# Patient Record
Sex: FEMALE | Race: WHITE | NOT HISPANIC OR LATINO | Employment: UNEMPLOYED | URBAN - METROPOLITAN AREA
[De-identification: names, ages, dates, MRNs, and addresses within clinical notes are randomized per-mention and may not be internally consistent; named-entity substitution may affect disease eponyms.]

---

## 2023-03-23 ENCOUNTER — APPOINTMENT (OUTPATIENT)
Dept: LAB | Facility: LAB | Age: 83
End: 2023-03-23
Payer: MEDICARE

## 2023-03-23 LAB
BASOPHILS (10*3/UL) IN BLOOD BY AUTOMATED COUNT: 0.07 X10E9/L (ref 0–0.1)
BASOPHILS/100 LEUKOCYTES IN BLOOD BY AUTOMATED COUNT: 0.9 % (ref 0–2)
EOSINOPHILS (10*3/UL) IN BLOOD BY AUTOMATED COUNT: 0.55 X10E9/L (ref 0–0.4)
EOSINOPHILS/100 LEUKOCYTES IN BLOOD BY AUTOMATED COUNT: 7.3 % (ref 0–6)
ERYTHROCYTE DISTRIBUTION WIDTH (RATIO) BY AUTOMATED COUNT: 13.8 % (ref 11.5–14.5)
ERYTHROCYTE MEAN CORPUSCULAR HEMOGLOBIN CONCENTRATION (G/DL) BY AUTOMATED: 31.5 G/DL (ref 32–36)
ERYTHROCYTE MEAN CORPUSCULAR VOLUME (FL) BY AUTOMATED COUNT: 90 FL (ref 80–100)
ERYTHROCYTES (10*6/UL) IN BLOOD BY AUTOMATED COUNT: 5.37 X10E12/L (ref 4–5.2)
FERRITIN (UG/LL) IN SER/PLAS: 44 UG/L (ref 8–150)
HEMATOCRIT (%) IN BLOOD BY AUTOMATED COUNT: 48.2 % (ref 36–46)
HEMOGLOBIN (G/DL) IN BLOOD: 15.2 G/DL (ref 12–16)
IMMATURE GRANULOCYTES/100 LEUKOCYTES IN BLOOD BY AUTOMATED COUNT: 0.4 % (ref 0–0.9)
IRON (UG/DL) IN SER/PLAS: 74 UG/DL (ref 35–150)
IRON BINDING CAPACITY (UG/DL) IN SER/PLAS: 343 UG/DL (ref 240–445)
IRON SATURATION (%) IN SER/PLAS: 22 % (ref 25–45)
LEUKOCYTES (10*3/UL) IN BLOOD BY AUTOMATED COUNT: 7.5 X10E9/L (ref 4.4–11.3)
LYMPHOCYTES (10*3/UL) IN BLOOD BY AUTOMATED COUNT: 1.16 X10E9/L (ref 0.8–3)
LYMPHOCYTES/100 LEUKOCYTES IN BLOOD BY AUTOMATED COUNT: 15.4 % (ref 13–44)
MONOCYTES (10*3/UL) IN BLOOD BY AUTOMATED COUNT: 0.61 X10E9/L (ref 0.05–0.8)
MONOCYTES/100 LEUKOCYTES IN BLOOD BY AUTOMATED COUNT: 8.1 % (ref 2–10)
NEUTROPHILS (10*3/UL) IN BLOOD BY AUTOMATED COUNT: 5.1 X10E9/L (ref 1.6–5.5)
NEUTROPHILS/100 LEUKOCYTES IN BLOOD BY AUTOMATED COUNT: 67.9 % (ref 40–80)
PLATELETS (10*3/UL) IN BLOOD AUTOMATED COUNT: 360 X10E9/L (ref 150–450)

## 2023-03-24 LAB — COBALAMIN (VITAMIN B12) (PG/ML) IN SER/PLAS: 672 PG/ML (ref 211–911)

## 2023-07-07 ENCOUNTER — LAB (OUTPATIENT)
Dept: LAB | Facility: LAB | Age: 83
End: 2023-07-07
Payer: MEDICARE

## 2023-07-07 DIAGNOSIS — R06.09 DYSPNEA ON EXERTION: ICD-10-CM

## 2023-07-07 DIAGNOSIS — E78.5 HYPERLIPIDEMIA, UNSPECIFIED HYPERLIPIDEMIA TYPE: ICD-10-CM

## 2023-07-07 DIAGNOSIS — D50.9 IRON DEFICIENCY ANEMIA, UNSPECIFIED IRON DEFICIENCY ANEMIA TYPE: ICD-10-CM

## 2023-07-07 DIAGNOSIS — E03.9 PRIMARY HYPOTHYROIDISM: ICD-10-CM

## 2023-07-07 PROBLEM — L71.9 ROSACEA: Status: ACTIVE | Noted: 2023-07-07

## 2023-07-07 PROBLEM — K44.9 HIATAL HERNIA WITH GERD AND ESOPHAGITIS: Status: ACTIVE | Noted: 2023-07-07

## 2023-07-07 PROBLEM — N95.1 POSTMENOPAUSAL DISORDER: Status: ACTIVE | Noted: 2023-07-07

## 2023-07-07 PROBLEM — K21.00 HIATAL HERNIA WITH GERD AND ESOPHAGITIS: Status: ACTIVE | Noted: 2023-07-07

## 2023-07-07 PROBLEM — N85.9 FLUID IN ENDOMETRIAL CAVITY: Status: ACTIVE | Noted: 2023-07-07

## 2023-07-07 PROBLEM — R01.1 HEART MURMUR: Status: ACTIVE | Noted: 2023-07-07

## 2023-07-07 PROBLEM — G56.01 CARPAL TUNNEL SYNDROME OF RIGHT WRIST: Status: ACTIVE | Noted: 2023-07-07

## 2023-07-07 PROBLEM — L30.9 ECZEMA: Status: ACTIVE | Noted: 2023-07-07

## 2023-07-07 PROBLEM — G89.29 CHRONIC PAIN OF LEFT KNEE: Status: ACTIVE | Noted: 2023-07-07

## 2023-07-07 PROBLEM — M25.512 LEFT SHOULDER PAIN: Status: ACTIVE | Noted: 2023-07-07

## 2023-07-07 PROBLEM — M25.562 CHRONIC PAIN OF LEFT KNEE: Status: ACTIVE | Noted: 2023-07-07

## 2023-07-07 PROBLEM — K59.09 CHRONIC CONSTIPATION: Status: ACTIVE | Noted: 2023-07-07

## 2023-07-07 PROBLEM — L29.2 VULVAR PRURITUS: Status: ACTIVE | Noted: 2023-07-07

## 2023-07-07 PROBLEM — R63.4 UNEXPLAINED WEIGHT LOSS: Status: ACTIVE | Noted: 2023-07-07

## 2023-07-07 PROBLEM — M54.50 LOWER BACK PAIN: Status: ACTIVE | Noted: 2023-07-07

## 2023-07-07 LAB
ALANINE AMINOTRANSFERASE (SGPT) (U/L) IN SER/PLAS: 12 U/L (ref 7–45)
ALBUMIN (G/DL) IN SER/PLAS: 4.1 G/DL (ref 3.4–5)
ALKALINE PHOSPHATASE (U/L) IN SER/PLAS: 127 U/L (ref 33–136)
ANION GAP IN SER/PLAS: 16 MMOL/L (ref 10–20)
ASPARTATE AMINOTRANSFERASE (SGOT) (U/L) IN SER/PLAS: 20 U/L (ref 9–39)
BASOPHILS (10*3/UL) IN BLOOD BY AUTOMATED COUNT: 0.09 X10E9/L (ref 0–0.1)
BASOPHILS/100 LEUKOCYTES IN BLOOD BY AUTOMATED COUNT: 1.2 % (ref 0–2)
BILIRUBIN TOTAL (MG/DL) IN SER/PLAS: 0.5 MG/DL (ref 0–1.2)
CALCIUM (MG/DL) IN SER/PLAS: 9.7 MG/DL (ref 8.6–10.3)
CARBON DIOXIDE, TOTAL (MMOL/L) IN SER/PLAS: 28 MMOL/L (ref 21–32)
CHLORIDE (MMOL/L) IN SER/PLAS: 103 MMOL/L (ref 98–107)
CHOLESTEROL (MG/DL) IN SER/PLAS: 186 MG/DL (ref 0–199)
CHOLESTEROL IN HDL (MG/DL) IN SER/PLAS: 47.6 MG/DL
CHOLESTEROL/HDL RATIO: 3.9
COBALAMIN (VITAMIN B12) (PG/ML) IN SER/PLAS: 502 PG/ML (ref 211–911)
CREATININE (MG/DL) IN SER/PLAS: 0.79 MG/DL (ref 0.5–1.05)
EOSINOPHILS (10*3/UL) IN BLOOD BY AUTOMATED COUNT: 0.57 X10E9/L (ref 0–0.4)
EOSINOPHILS/100 LEUKOCYTES IN BLOOD BY AUTOMATED COUNT: 7.5 % (ref 0–6)
ERYTHROCYTE DISTRIBUTION WIDTH (RATIO) BY AUTOMATED COUNT: 13.1 % (ref 11.5–14.5)
ERYTHROCYTE MEAN CORPUSCULAR HEMOGLOBIN CONCENTRATION (G/DL) BY AUTOMATED: 30.8 G/DL (ref 32–36)
ERYTHROCYTE MEAN CORPUSCULAR VOLUME (FL) BY AUTOMATED COUNT: 90 FL (ref 80–100)
ERYTHROCYTES (10*6/UL) IN BLOOD BY AUTOMATED COUNT: 5.45 X10E12/L (ref 4–5.2)
FERRITIN (UG/LL) IN SER/PLAS: 40 UG/L (ref 8–150)
GFR FEMALE: 74 ML/MIN/1.73M2
GLUCOSE (MG/DL) IN SER/PLAS: 84 MG/DL (ref 74–99)
HEMATOCRIT (%) IN BLOOD BY AUTOMATED COUNT: 49.1 % (ref 36–46)
HEMOGLOBIN (G/DL) IN BLOOD: 15.1 G/DL (ref 12–16)
IMMATURE GRANULOCYTES/100 LEUKOCYTES IN BLOOD BY AUTOMATED COUNT: 0.3 % (ref 0–0.9)
IRON (UG/DL) IN SER/PLAS: 54 UG/DL (ref 35–150)
IRON BINDING CAPACITY (UG/DL) IN SER/PLAS: 380 UG/DL (ref 240–445)
IRON SATURATION (%) IN SER/PLAS: 14 % (ref 25–45)
LDL: 116 MG/DL (ref 0–99)
LEUKOCYTES (10*3/UL) IN BLOOD BY AUTOMATED COUNT: 7.6 X10E9/L (ref 4.4–11.3)
LYMPHOCYTES (10*3/UL) IN BLOOD BY AUTOMATED COUNT: 1.07 X10E9/L (ref 0.8–3)
LYMPHOCYTES/100 LEUKOCYTES IN BLOOD BY AUTOMATED COUNT: 14.1 % (ref 13–44)
MONOCYTES (10*3/UL) IN BLOOD BY AUTOMATED COUNT: 0.6 X10E9/L (ref 0.05–0.8)
MONOCYTES/100 LEUKOCYTES IN BLOOD BY AUTOMATED COUNT: 7.9 % (ref 2–10)
NEUTROPHILS (10*3/UL) IN BLOOD BY AUTOMATED COUNT: 5.24 X10E9/L (ref 1.6–5.5)
NEUTROPHILS/100 LEUKOCYTES IN BLOOD BY AUTOMATED COUNT: 69 % (ref 40–80)
PLATELETS (10*3/UL) IN BLOOD AUTOMATED COUNT: 449 X10E9/L (ref 150–450)
POTASSIUM (MMOL/L) IN SER/PLAS: 4.8 MMOL/L (ref 3.5–5.3)
PROTEIN TOTAL: 6.9 G/DL (ref 6.4–8.2)
SODIUM (MMOL/L) IN SER/PLAS: 142 MMOL/L (ref 136–145)
THYROTROPIN (MIU/L) IN SER/PLAS BY DETECTION LIMIT <= 0.05 MIU/L: 3.07 MIU/L (ref 0.44–3.98)
THYROXINE (T4) FREE (NG/DL) IN SER/PLAS: 0.84 NG/DL (ref 0.61–1.12)
TRIGLYCERIDE (MG/DL) IN SER/PLAS: 114 MG/DL (ref 0–149)
UREA NITROGEN (MG/DL) IN SER/PLAS: 18 MG/DL (ref 6–23)
VLDL: 23 MG/DL (ref 0–40)

## 2023-07-07 PROCEDURE — 84443 ASSAY THYROID STIM HORMONE: CPT

## 2023-07-07 PROCEDURE — 83550 IRON BINDING TEST: CPT

## 2023-07-07 PROCEDURE — 80061 LIPID PANEL: CPT

## 2023-07-07 PROCEDURE — 82607 VITAMIN B-12: CPT

## 2023-07-07 PROCEDURE — 36415 COLL VENOUS BLD VENIPUNCTURE: CPT

## 2023-07-07 PROCEDURE — 85025 COMPLETE CBC W/AUTO DIFF WBC: CPT

## 2023-07-07 PROCEDURE — 82728 ASSAY OF FERRITIN: CPT

## 2023-07-07 PROCEDURE — 84439 ASSAY OF FREE THYROXINE: CPT

## 2023-07-07 PROCEDURE — 83540 ASSAY OF IRON: CPT

## 2023-07-07 PROCEDURE — 80053 COMPREHEN METABOLIC PANEL: CPT

## 2023-07-07 NOTE — PROGRESS NOTES
Orders Placed This Encounter   Procedures    T4, free     Standing Status:   Future     Standing Expiration Date:   7/7/2024     Order Specific Question:   Release result to MyChart     Answer:   Immediate    Tsh With Reflex To Free T4 If Abnormal     Standing Status:   Future     Standing Expiration Date:   7/7/2024     Order Specific Question:   Release result to MyChart     Answer:   Immediate    CBC and Auto Differential     Standing Status:   Future     Standing Expiration Date:   7/7/2024     Order Specific Question:   Release result to MyChart     Answer:   Immediate    Vitamin B12     Standing Status:   Future     Standing Expiration Date:   7/7/2024     Order Specific Question:   Release result to MyChart     Answer:   Immediate    Lipid panel     Standing Status:   Future     Standing Expiration Date:   7/7/2024     Order Specific Question:   Release result to MyChart     Answer:   Immediate    Comprehensive metabolic panel     Standing Status:   Future     Standing Expiration Date:   7/7/2024     Order Specific Question:   Release result to MyChart     Answer:   Immediate    Iron and TIBC     Standing Status:   Future     Standing Expiration Date:   7/7/2024     Order Specific Question:   Release result to MyChart     Answer:   Immediate    Ferritin     Standing Status:   Future     Standing Expiration Date:   7/7/2024     Order Specific Question:   Release result to MyChart     Answer:   Immediate

## 2023-07-13 ENCOUNTER — OFFICE VISIT (OUTPATIENT)
Dept: PRIMARY CARE | Facility: CLINIC | Age: 83
End: 2023-07-13
Payer: MEDICARE

## 2023-07-13 VITALS
HEIGHT: 61 IN | DIASTOLIC BLOOD PRESSURE: 79 MMHG | BODY MASS INDEX: 28.89 KG/M2 | HEART RATE: 86 BPM | WEIGHT: 153 LBS | SYSTOLIC BLOOD PRESSURE: 124 MMHG

## 2023-07-13 DIAGNOSIS — Z00.00 ROUTINE GENERAL MEDICAL EXAMINATION AT HEALTH CARE FACILITY: Primary | ICD-10-CM

## 2023-07-13 DIAGNOSIS — M25.562 CHRONIC PAIN OF LEFT KNEE: ICD-10-CM

## 2023-07-13 DIAGNOSIS — Z12.31 VISIT FOR SCREENING MAMMOGRAM: ICD-10-CM

## 2023-07-13 DIAGNOSIS — G89.29 CHRONIC PAIN OF LEFT KNEE: ICD-10-CM

## 2023-07-13 DIAGNOSIS — G31.84 MILD COGNITIVE IMPAIRMENT: ICD-10-CM

## 2023-07-13 PROCEDURE — 1160F RVW MEDS BY RX/DR IN RCRD: CPT | Performed by: INTERNAL MEDICINE

## 2023-07-13 PROCEDURE — 1036F TOBACCO NON-USER: CPT | Performed by: INTERNAL MEDICINE

## 2023-07-13 PROCEDURE — 1159F MED LIST DOCD IN RCRD: CPT | Performed by: INTERNAL MEDICINE

## 2023-07-13 PROCEDURE — 99214 OFFICE O/P EST MOD 30 MIN: CPT | Performed by: INTERNAL MEDICINE

## 2023-07-13 PROCEDURE — G0439 PPPS, SUBSEQ VISIT: HCPCS | Performed by: INTERNAL MEDICINE

## 2023-07-13 PROCEDURE — 1170F FXNL STATUS ASSESSED: CPT | Performed by: INTERNAL MEDICINE

## 2023-07-13 RX ORDER — DESOXIMETASONE 2.5 MG/G
CREAM TOPICAL
COMMUNITY
Start: 2023-04-24

## 2023-07-13 RX ORDER — TRIAMCINOLONE ACETONIDE 0.25 MG/G
CREAM TOPICAL
COMMUNITY
Start: 2023-02-10 | End: 2024-01-05 | Stop reason: ALTCHOICE

## 2023-07-13 RX ORDER — MULTIVIT-MIN/FA/LYCOPEN/LUTEIN .4-300-25
TABLET ORAL
COMMUNITY
Start: 2014-01-20

## 2023-07-13 ASSESSMENT — ACTIVITIES OF DAILY LIVING (ADL)
DOING_HOUSEWORK: INDEPENDENT
MANAGING_FINANCES: INDEPENDENT
TAKING_MEDICATION: INDEPENDENT
DRESSING: INDEPENDENT
GROCERY_SHOPPING: INDEPENDENT
BATHING: INDEPENDENT

## 2023-07-13 NOTE — PATIENT INSTRUCTIONS
"Memory check, you did well, only missed the date and day of week  Socialize, exercise, eat a healthy diet    Iron deficiency anemia, continue to see Trista every 6 months    Call [phone] to schedule mammogram    Esophagitis, stay on prilosec 40mg daily    Labs all looked good    I recommend that you get the shingrix shots. Shingles vaccination requires a 2 shots series divided by 2 to 6 months. Get at the pharmacy. Ask the pharmacist \"how much\" prior to injected due cost varies based on your insurance. Shingrix can make you feel tired and achy for a few days after so do not get it prior to a big event . Should be free    You need a pneumovax, second pneumonia shot, had prevnar 13 in 2020, get at the pharmacy    Thickened endometrial lining (lining of the uterus), you need to see gynecology. I will send them an email and they will call     Chronic knee pain, ordered handicap placard, take to [facility]    Recheck in 1 year  "

## 2023-07-13 NOTE — PROGRESS NOTES
Subjective   Patient ID: Nicole oBbo is a 82 y.o. female who presents for Medicare Annual Wellness Visit Subsequent.    HPI     Review of Systems    Objective   There were no vitals taken for this visit.    Physical Exam    Assessment/Plan

## 2023-07-13 NOTE — PROGRESS NOTES
"Subjective   Reason for Visit: Nicole Bobo is an 82 y.o. female here for a Medicare Wellness visit.     Past Medical, Surgical, and Family History reviewed and updated in chart.    Reviewed all medications by prescribing practitioner or clinical pharmacist (such as prescriptions, OTCs, herbal therapies and supplements) and documented in the medical record.    She was on prilosec 40mg daily for 1 year, she just finished it up  She does not get heartburn    She has some word finding issues and remembering peoples names    She gets knee pain, uses a cane, would like a handicap placard due to cannot walk very far    No cp or pressure  She gets sob when she walks a longer distance  She does wheeze when she is near a dog  Bowels are regular. No black stools or blood in the stool  Urine flow is fine, she has some urge incontinence, nocturia 2-3 times  a night.   No spotting        Patient Care Team:  Aliyah Sosa, DO as PCP - General  Aliyah Sosa DO as PCP - [employer] PCP     Review of Systems    Objective   Vitals:  /79   Pulse 86   Ht 1.549 m (5' 1\")   Wt 69.4 kg (153 lb)   BMI 28.91 kg/m²       Physical Exam  Constitutional:       Appearance: Normal appearance.   HENT:      Nose:      Comments: Turbs are pale  Neck:      Vascular: No carotid bruit.   Cardiovascular:      Rate and Rhythm: Normal rate and regular rhythm.      Heart sounds: No murmur heard.  Pulmonary:      Effort: Pulmonary effort is normal.      Comments: Few dry basilar crackles  Chest:   Breasts:     Right: No mass.      Left: No mass.   Abdominal:      General: Abdomen is flat.      Palpations: Abdomen is soft. There is no mass.   Musculoskeletal:      Right lower leg: No edema.      Left lower leg: No edema.   Lymphadenopathy:      Cervical: No cervical adenopathy.   Neurological:      Comments: MMSE 27/30, missed town , date and attila   Psychiatric:         Mood and Affect: Mood normal.         Assessment/Plan " "  Problem List Items Addressed This Visit       Chronic pain of left knee     Other Visit Diagnoses       Routine general medical examination at health care facility    -  Primary    Visit for screening mammogram        Mild cognitive impairment                Patient Instructions   Memory check, you did well, only missed the date and day of week  Socialize, exercise, eat a healthy diet    Iron deficiency anemia, continue to see Trista every 6 months    Call [phone] to schedule mammogram    Esophagitis, stay on prilosec 40mg daily    Labs all looked good    I recommend that you get the shingrix shots. Shingles vaccination requires a 2 shots series divided by 2 to 6 months. Get at the pharmacy. Ask the pharmacist \"how much\" prior to injected due cost varies based on your insurance. Shingrix can make you feel tired and achy for a few days after so do not get it prior to a big event . Should be free    You need a pneumovax, second pneumonia shot, had prevnar 13 in 2020, get at the pharmacy    Thickened endometrial lining (lining of the uterus), you need to see gynecology. I will send them an email and they will call     Chronic knee pain, ordered handicap placard, take to [facility]    Recheck in 1 year      "

## 2023-08-25 RX ORDER — NITROFURANTOIN 25; 75 MG/1; MG/1
1 CAPSULE ORAL 2 TIMES DAILY
COMMUNITY
Start: 2022-12-10 | End: 2023-10-04 | Stop reason: ALTCHOICE

## 2023-08-25 RX ORDER — CEPHALEXIN 500 MG/1
500 CAPSULE ORAL EVERY 12 HOURS
COMMUNITY
Start: 2023-03-21 | End: 2023-10-04 | Stop reason: ALTCHOICE

## 2023-08-25 RX ORDER — SULFAMETHOXAZOLE AND TRIMETHOPRIM 800; 160 MG/1; MG/1
1 TABLET ORAL 2 TIMES DAILY
COMMUNITY
Start: 2022-10-02 | End: 2023-10-04 | Stop reason: ALTCHOICE

## 2023-08-25 RX ORDER — ESOMEPRAZOLE MAGNESIUM 40 MG/1
1 CAPSULE, DELAYED RELEASE ORAL DAILY
COMMUNITY
Start: 2023-02-28 | End: 2023-10-04 | Stop reason: ALTCHOICE

## 2023-09-20 DIAGNOSIS — D50.9 IRON DEFICIENCY ANEMIA, UNSPECIFIED IRON DEFICIENCY ANEMIA TYPE: Primary | ICD-10-CM

## 2023-09-20 PROBLEM — D64.9 ANEMIA: Status: ACTIVE | Noted: 2023-09-20

## 2023-10-04 ENCOUNTER — OFFICE VISIT (OUTPATIENT)
Dept: HEMATOLOGY/ONCOLOGY | Facility: HOSPITAL | Age: 83
End: 2023-10-04
Payer: MEDICARE

## 2023-10-04 ENCOUNTER — INFUSION (OUTPATIENT)
Dept: HEMATOLOGY/ONCOLOGY | Facility: HOSPITAL | Age: 83
End: 2023-10-04
Payer: MEDICARE

## 2023-10-04 ENCOUNTER — LAB (OUTPATIENT)
Dept: LAB | Facility: LAB | Age: 83
End: 2023-10-04
Payer: MEDICARE

## 2023-10-04 VITALS
WEIGHT: 153 LBS | HEART RATE: 87 BPM | BODY MASS INDEX: 28.89 KG/M2 | HEIGHT: 61 IN | DIASTOLIC BLOOD PRESSURE: 74 MMHG | RESPIRATION RATE: 18 BRPM | OXYGEN SATURATION: 95 % | SYSTOLIC BLOOD PRESSURE: 129 MMHG | TEMPERATURE: 98.1 F

## 2023-10-04 DIAGNOSIS — D50.8 OTHER IRON DEFICIENCY ANEMIA: ICD-10-CM

## 2023-10-04 DIAGNOSIS — D50.9 IRON DEFICIENCY ANEMIA, UNSPECIFIED IRON DEFICIENCY ANEMIA TYPE: Primary | ICD-10-CM

## 2023-10-04 DIAGNOSIS — D63.1 ANEMIA DUE TO STAGE 3 CHRONIC KIDNEY DISEASE, UNSPECIFIED WHETHER STAGE 3A OR 3B CKD (MULTI): ICD-10-CM

## 2023-10-04 DIAGNOSIS — N18.30 ANEMIA DUE TO STAGE 3 CHRONIC KIDNEY DISEASE, UNSPECIFIED WHETHER STAGE 3A OR 3B CKD (MULTI): ICD-10-CM

## 2023-10-04 LAB
BASOPHILS # BLD AUTO: 0.11 X10*3/UL (ref 0–0.1)
BASOPHILS NFR BLD AUTO: 1.4 %
EOSINOPHIL # BLD AUTO: 0.5 X10*3/UL (ref 0–0.4)
EOSINOPHIL NFR BLD AUTO: 6.5 %
ERYTHROCYTE [DISTWIDTH] IN BLOOD BY AUTOMATED COUNT: 15.6 % (ref 11.5–14.5)
FERRITIN SERPL-MCNC: 9 NG/ML (ref 8–150)
HCT VFR BLD AUTO: 34.3 % (ref 36–46)
HGB BLD-MCNC: 10.1 G/DL (ref 12–16)
IMM GRANULOCYTES # BLD AUTO: 0.03 X10*3/UL (ref 0–0.5)
IMM GRANULOCYTES NFR BLD AUTO: 0.4 % (ref 0–0.9)
IRON SATN MFR SERPL: 3 % (ref 25–45)
IRON SERPL-MCNC: 12 UG/DL (ref 35–150)
LYMPHOCYTES # BLD AUTO: 1.41 X10*3/UL (ref 0.8–3)
LYMPHOCYTES NFR BLD AUTO: 18.4 %
MCH RBC QN AUTO: 23 PG (ref 26–34)
MCHC RBC AUTO-ENTMCNC: 29.4 G/DL (ref 32–36)
MCV RBC AUTO: 78 FL (ref 80–100)
MONOCYTES # BLD AUTO: 0.52 X10*3/UL (ref 0.05–0.8)
MONOCYTES NFR BLD AUTO: 6.8 %
NEUTROPHILS # BLD AUTO: 5.09 X10*3/UL (ref 1.6–5.5)
NEUTROPHILS NFR BLD AUTO: 66.5 %
NRBC BLD-RTO: 0 /100 WBCS (ref 0–0)
PLATELET # BLD AUTO: 537 X10*3/UL (ref 150–450)
PMV BLD AUTO: 8.8 FL (ref 7.5–11.5)
RBC # BLD AUTO: 4.4 X10*6/UL (ref 4–5.2)
TIBC SERPL-MCNC: 460 UG/DL (ref 240–445)
UIBC SERPL-MCNC: 448 UG/DL (ref 110–370)
VIT B12 SERPL-MCNC: 448 PG/ML (ref 211–911)
WBC # BLD AUTO: 7.7 X10*3/UL (ref 4.4–11.3)

## 2023-10-04 PROCEDURE — 1036F TOBACCO NON-USER: CPT

## 2023-10-04 PROCEDURE — 83550 IRON BINDING TEST: CPT

## 2023-10-04 PROCEDURE — 36415 COLL VENOUS BLD VENIPUNCTURE: CPT

## 2023-10-04 PROCEDURE — 1160F RVW MEDS BY RX/DR IN RCRD: CPT

## 2023-10-04 PROCEDURE — 99213 OFFICE O/P EST LOW 20 MIN: CPT

## 2023-10-04 PROCEDURE — 1125F AMNT PAIN NOTED PAIN PRSNT: CPT

## 2023-10-04 PROCEDURE — 1159F MED LIST DOCD IN RCRD: CPT

## 2023-10-04 PROCEDURE — 83540 ASSAY OF IRON: CPT

## 2023-10-04 PROCEDURE — 82607 VITAMIN B-12: CPT

## 2023-10-04 PROCEDURE — 82728 ASSAY OF FERRITIN: CPT

## 2023-10-04 PROCEDURE — 85025 COMPLETE CBC W/AUTO DIFF WBC: CPT

## 2023-10-04 RX ORDER — METHYLPREDNISOLONE SODIUM SUCCINATE 40 MG/ML
40 INJECTION INTRAMUSCULAR; INTRAVENOUS ONCE
OUTPATIENT
Start: 2023-10-11

## 2023-10-04 RX ORDER — METHYLPREDNISOLONE SODIUM SUCCINATE 40 MG/ML
40 INJECTION INTRAMUSCULAR; INTRAVENOUS AS NEEDED
Status: CANCELLED | OUTPATIENT
Start: 2023-10-11

## 2023-10-04 RX ORDER — DIPHENHYDRAMINE HYDROCHLORIDE 50 MG/ML
50 INJECTION INTRAMUSCULAR; INTRAVENOUS ONCE
OUTPATIENT
Start: 2023-10-11

## 2023-10-04 RX ORDER — FAMOTIDINE 10 MG/ML
20 INJECTION INTRAVENOUS ONCE AS NEEDED
Status: CANCELLED | OUTPATIENT
Start: 2023-10-11

## 2023-10-04 RX ORDER — EPINEPHRINE 0.3 MG/.3ML
0.3 INJECTION SUBCUTANEOUS EVERY 5 MIN PRN
Status: CANCELLED | OUTPATIENT
Start: 2023-10-11

## 2023-10-04 RX ORDER — DIPHENHYDRAMINE HYDROCHLORIDE 50 MG/ML
50 INJECTION INTRAMUSCULAR; INTRAVENOUS ONCE
OUTPATIENT
Start: 2023-10-18

## 2023-10-04 RX ORDER — METHYLPREDNISOLONE SODIUM SUCCINATE 40 MG/ML
40 INJECTION INTRAMUSCULAR; INTRAVENOUS ONCE
OUTPATIENT
Start: 2023-10-18

## 2023-10-04 RX ORDER — ALBUTEROL SULFATE 0.83 MG/ML
3 SOLUTION RESPIRATORY (INHALATION) AS NEEDED
Status: CANCELLED | OUTPATIENT
Start: 2023-10-11

## 2023-10-04 RX ORDER — DIPHENHYDRAMINE HYDROCHLORIDE 50 MG/ML
50 INJECTION INTRAMUSCULAR; INTRAVENOUS AS NEEDED
Status: CANCELLED | OUTPATIENT
Start: 2023-10-11

## 2023-10-04 ASSESSMENT — PAIN SCALES - GENERAL: PAINLEVEL: 10-WORST PAIN EVER

## 2023-10-04 NOTE — PROGRESS NOTES
Patient Visit Information:   Visit Type: Benign Heme Follow-up Anemia    Treatment Synopsis:   82-year-old woman with no sig PMH referred for evaluation and management of recurrent microcytic anemia c/w DINH. Patient first noted have microcytic anemia in 7/2020 with H/H 8.1/29.4 MCV 65 with ferritin <8. Patient had EGD/colonoscopy c/w mild gastritis was placed on PPI for 2 months.  She was also placed on oral iron but stopped taking it due to increasing constipation problems (of note, patient has chronic constipation takes Miralax Daily). Patient states that she only took it for 1 week before stopping. In March 2021, repeat CBC showed continued anemia but improved iron studies c/w H/H 10.8/38, MCV 78, ferritin 60, iron sat% 33%.    In may 2021, patient developed SOB/BURROUGHS . CBC on 5/14/2021 showed hemoglobin was 6.6/22.9, MCV 67 with ferritin of 11, and iron sat uncalculatable. Of note, accompanying her anemia is thrombocytosis (527-->693-->521) which likely represents a reactive process. She has had normal WBC's. She was sent to ED and transfused 2 units PRBC. Patient states that prior to ED visit she was in her usual state of health.     Notation from 6/15/2021: Fluid within endometrial cavity of the uterus. Given the postmenopausal status of the patient recommend correlation with pelvic ultrasound to assess for underlying endometrial lesions. Right middle lobe pulmonary nodule demonstrating stability since 2015 benign. Moderate sliding hiatal hernia. Low-density lesion within segment 7 has demonstrated stability since 2015 favoring hepatic cyst.       History of Present Illness:     ID Statement:   CHELSEY FLETCHER is a 82 year old Female      Chief Complaint: Microcytic anemia  Interval History:   Patient presents today for follow-up regarding microcytic and iron deficiency anemia.  Patient states overall she feels very well.  She denies any fever, recent illness, bleeding or bruising, cold hands or feet, no  pica, no abdominal pain, no melena, no bleeding from any location, no bowel or bladder concerns, no cough, no shortness of breath or chest pain.  he has had no nosebleeds or hematemesis.  She has a history of arthritis and does have discomfort in her lower back with excessive movement.  But she continues to do her ADLs throughout the day without difficulty.  She has had left knee issues and swelling and is seeing orthopedic.  She had a recent colonoscopy less than a year and a half ago that did not note any bleeding though did note a hiatal hernia in which she received 6 months of Protonix.  She has had no follow-up since.  May 2022 she had 3 doses of IV Venofer. On assessment, reports recent UTI on abx completed yesterday.  Appetite is stable.  She notes a well balanced diet. No weight loss.     Review of Systems:   -12 pt ROS was performed, pertinent positive and negative findings as per HPI above, remainder of systems reviewed and are negative.      PMH/PSH:   Chronic constipation, Eczema, macular degeneration, tonsillectomy, appendectomy   FH:   denies any FH of malignancy, bleeding or clotting disorders   Sox Hx:   Smoked x 1 year' Worked at [employer]. . Lives in [place]. 3 children.      Allergies and Intolerances: Dog Dander  No Known drug Allergies    Outpatient Medication Profile:  * Incomplete Medication History as of 10-Dec-2022 09:16 documented in Structured Notes   cephalexin 500 mg oral capsule: 1 cap(s) orally every 12 hours , Start Date: 21-Mar-2023   Macrobid 100 mg oral capsule: 1  orally 2 times a day, Start Date: 10-Dec-2022   Pyridium 200 mg oral tablet: 1 tab(s) orally 3 times a day (after meals) , Start Date: 27-Nov-2022   Bactrim  mg-160 mg oral tablet: 1 tab(s) orally 2 times a day x 5 days , Start Date: 27-Nov-2022   Bactrim  mg-160 mg oral tablet: 1 tab(s) orally 2 times a day , Start Date: 02-Oct-2022   Centrum oral tablet: 1 tab(s) orally once a day   clobetasol  0.05% topical cream: Apply topically to affected area 2 times a day, As Needed   desoximetasone 0.05% topical cream: Apply topically to affected area 2 times a day, As Needed   Triamcinolone Acetonide in Absorbase 0.05% topical ointment: Apply topically to affected area 2 times a day, As Needed   PreserVision oral capsule: 1 cap(s) orally twice a day   Advil 200 mg oral tablet: 3 tab(s) orally every 6 hours, As Needed    Vitals and Measurements:   Last 3 Weights & Heights: Date: Weight/Scale Type standing Height:   03-May-2022 13:36  71.4  kg         153.8  cm  16-Nov-2021 12:00  69.9  kg         156  cm  10-Sep-2021 13:22  70.2  kg / standing scale  156  cm    Physical Exam:      Constitutional: Well developed, awake/alert/oriented  x3, no distress, alert and cooperative   Eyes: PERRL, EOMI, clear sclera   Respiratory/Thorax: Patent airways, CTAB, normal  breath sounds with good chest expansion, thorax symmetric   Cardiovascular: Regular, rate and rhythm, no murmurs,  2+ equal pulses of the extremities, normal S 1and S 2   Gastrointestinal: Nondistended, soft, non-tender,  no rebound tenderness or guarding, no masses palpable, no organomegaly, +BS, no bruits   Extremities: normal extremities, no cyanosis edema,  contusions or wounds, no clubbing   Neurological: alert and oriented x3, intact senses,  motor, response and reflexes, normal strength   Psychological: Appropriate mood and behavior   Skin: Warm and dry, no lesions, no rashes     Lab Results:   Latest Reference Range & Units 10/04/23 08:40   FERRITIN 8 - 150 ng/mL 9   IRON 35 - 150 ug/dL 12 (L)   TIBC 240 - 445 ug/dL 460 (H)   UIBC 110 - 370 ug/dL 448 (H)   % Saturation 25 - 45 % 3 (L)   WBC 4.4 - 11.3 x10*3/uL 7.7   nRBC 0.0 - 0.0 /100 WBCs 0.0   RBC 4.00 - 5.20 x10*6/uL 4.40   HEMOGLOBIN 12.0 - 16.0 g/dL 10.1 (L)   HEMATOCRIT 36.0 - 46.0 % 34.3 (L)   MCV 80 - 100 fL 78 (L)   MCH 26.0 - 34.0 pg 23.0 (L)   MCHC 32.0 - 36.0 g/dL 29.4 (L)   RED CELL  DISTRIBUTION WIDTH 11.5 - 14.5 % 15.6 (H)   Platelets 150 - 450 x10*3/uL 537 (H)   MEAN PLATELET VOLUME 7.5 - 11.5 fL 8.8   Neutrophils % 40.0 - 80.0 % 66.5   Immature Granulocytes %, Automated 0.0 - 0.9 % 0.4   Lymphocytes % 13.0 - 44.0 % 18.4   Monocytes % 2.0 - 10.0 % 6.8   Eosinophils % 0.0 - 6.0 % 6.5   Basophils % 0.0 - 2.0 % 1.4   Neutrophils Absolute 1.60 - 5.50 x10*3/uL 5.09   Immature Granulocytes Absolute, Automated 0.00 - 0.50 x10*3/uL 0.03   Lymphocytes Absolute 0.80 - 3.00 x10*3/uL 1.41   Monocytes Absolute 0.05 - 0.80 x10*3/uL 0.52   Eosinophils Absolute 0.00 - 0.40 x10*3/uL 0.50 (H)   Basophils Absolute 0.00 - 0.10 x10*3/uL 0.11 (H)     CBC date/time       WBC     HGB     HCT     PLT     Neut      23-Mar-2023 09:40   7.5     15.2    48.2(H) 360     5.10  17-Aug-2022 08:11   8.2     15.1    48.2(H) 308     5.75(H)  28-Apr-2022 14:25   7.6     10.4(L) 35.0(L) 496(H)  N/A  20-Dec-2021 09:38   8.4     13.9    44.2    356     N/A  14-Oct-2021 11:02   8.9     14.0    45.1    421     6.17(H)  14-Sep-2021 14:32   7.6     13.0    41.8    368     4.99  10-Sep-2021 13:47   7.8     13.3    42.0    390     N/A    BMP date/time       NA              K               CL              CO2             BUN             CREAT             17-Aug-2022 08:11   143             4.3             106             N/A             19              0.86  14-Sep-2021 14:32   140             4.0             105             N/A             18              0.88  25-May-2021 13:50   141             4.0             105             N/A             16              0.65  14-May-2021 12:51   140             4.5             105             N/A             19              0.80  06-Mar-2021 08:10   140             4.2             105             N/A             22              0.87  02-Jul-2020 09:05   140             4.3             106             N/A             21              0.81  02-Jul-2019 07:29   145             5.2             108(H)           N/A             21              1.07(H)    I have reviewed these laboratory results:    Complete Blood Count + Differential  23-Mar-2023 09:40:00     Result Value   White Blood Cell Count  7.5   Red Blood Cell Count  5.37   H  HGB  15.2   HCT  48.2   H  MCV  90   MCHC  31.5   L  PLT  360   RDW-CV  13.8   Neutrophil %  67.9   Immature Granulocytes %  0.4   Lymphocyte %  15.4   Monocyte %  8.1   Eosinophil %  7.3   Basophil %  0.9   Neutrophil Count  5.10   Lymphocyte Count  1.16   Monocyte Count  0.61   Eosinophil Count  0.55   H  Basophil Count  0.07     Iron + TIBC, Serum  23-Mar-2023 09:40:00     Result Value   Iron, Serum  74   Total Iron Binding Capacity  343   % Saturation  22   L    Ferritin, Serum  23-Mar-2023 09:40:00     Result Value   Ferritin, Serum  44     Vitamin B12, Serum  23-Mar-2023 09:40:00     Result Value   Vitamin B12, Serum  672       Radiology Result:    Impression:    1. Fluid within endometrial cavity of the uterus. Given the  postmenopausal status of the patient recommend correlation with  pelvic ultrasound to assess for underlying endometrial lesions.  2. Right middle lobe pulmonary nodule demonstrating stability since  2015 benign  3. Fatty atrophy of the bilateral gluteus medius musculature  4. Moderate sliding hiatal hernia  5. Low-density lesion withinsegment 7 has demonstrated stability  since 2015 favoring hepatic cyst.      CT Abdomen and Pelvis with IV Contrast [Jun 16 2021 11:14PM]      Assessment and Plan:     Patient is an 82-year-old woman with no significant PMH referred for follow-up and management of recurrent microcytic anemia c/w DINH likely from GI blood loss or malabsorption due to hiatal hernia.  Today her hemoglobin is 10.1, iron T sat 3 and ferritin 9.  Patient elected for IV iron due to occasional constipation unless on MiraLAX.  IV Feraheme ordered x2 doses to start next week following pre-CERT.    Previous imaging show moderate sliding hiatal hernia;  asymptomatic, PPI finished after 6 months of prescription.  No follow-up planned with GI.  Recommended follow-up.    Fluid within endometrial cavity of the uterus. Endometrial thickening but no PMB.   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     7/2020: 5 cm hiatal hernia, EGD with  LA Grade B reflux esophagitis and gastritis biopsied and was negative and Colonoscopy unremarkable.   Plan:   Reviewed and discussed lab, imaging, and pathology results with patient in detail as well as diagnosis, prognosis, and treatment options.    Future IV iron to be given with with premeds benadryl and Hydrocortisone (h/o Eczema and notice worsening pruritis after each Venofer infusion in past)     Refer back to GI     Follow-Up:    RTC     -1 week for IV iron with premeds.   -4 weeks after last iron infusion for labs, will call with results   -3 months with repeat labs and possible IV iron.     Referral:  -GI for consideration of repeat scopes +/- video capsule due to HH  -PCP as needed    Patient verbalized understanding, and all her questions were answered to her satisfaction.       Patient Instructions:     Instructions Type: nutrition, foods high in iron

## 2023-10-10 PROBLEM — K90.9 IMPAIRED INTESTINAL ABSORPTION (HHS-HCC): Status: ACTIVE | Noted: 2023-10-10

## 2023-10-10 PROBLEM — E61.1 IRON DEFICIENCY: Chronic | Status: ACTIVE | Noted: 2023-10-10

## 2023-10-10 RX ORDER — HEPARIN SODIUM,PORCINE/PF 10 UNIT/ML
50 SYRINGE (ML) INTRAVENOUS AS NEEDED
Status: CANCELLED | OUTPATIENT
Start: 2023-10-11

## 2023-10-10 RX ORDER — HEPARIN SODIUM 1000 [USP'U]/ML
2000 INJECTION, SOLUTION INTRAVENOUS; SUBCUTANEOUS AS NEEDED
Status: CANCELLED | OUTPATIENT
Start: 2023-10-11

## 2023-10-10 RX ORDER — HEPARIN 100 UNIT/ML
500 SYRINGE INTRAVENOUS AS NEEDED
Status: CANCELLED | OUTPATIENT
Start: 2023-10-11

## 2023-10-11 ENCOUNTER — INFUSION (OUTPATIENT)
Dept: HEMATOLOGY/ONCOLOGY | Facility: HOSPITAL | Age: 83
End: 2023-10-11
Payer: MEDICARE

## 2023-10-11 VITALS
DIASTOLIC BLOOD PRESSURE: 77 MMHG | WEIGHT: 153 LBS | SYSTOLIC BLOOD PRESSURE: 131 MMHG | BODY MASS INDEX: 28.92 KG/M2 | HEART RATE: 72 BPM | RESPIRATION RATE: 18 BRPM | OXYGEN SATURATION: 96 % | TEMPERATURE: 97.7 F

## 2023-10-11 DIAGNOSIS — D50.8 OTHER IRON DEFICIENCY ANEMIA: ICD-10-CM

## 2023-10-11 PROCEDURE — 2500000004 HC RX 250 GENERAL PHARMACY W/ HCPCS (ALT 636 FOR OP/ED)

## 2023-10-11 PROCEDURE — 96365 THER/PROPH/DIAG IV INF INIT: CPT

## 2023-10-11 RX ORDER — EPINEPHRINE 0.3 MG/.3ML
0.3 INJECTION SUBCUTANEOUS EVERY 5 MIN PRN
Status: CANCELLED | OUTPATIENT
Start: 2023-10-18

## 2023-10-11 RX ORDER — DIPHENHYDRAMINE HYDROCHLORIDE 50 MG/ML
50 INJECTION INTRAMUSCULAR; INTRAVENOUS AS NEEDED
Status: CANCELLED | OUTPATIENT
Start: 2023-10-18

## 2023-10-11 RX ORDER — FAMOTIDINE 10 MG/ML
20 INJECTION INTRAVENOUS ONCE AS NEEDED
Status: CANCELLED | OUTPATIENT
Start: 2023-10-18

## 2023-10-11 RX ORDER — ALBUTEROL SULFATE 0.83 MG/ML
3 SOLUTION RESPIRATORY (INHALATION) AS NEEDED
Status: CANCELLED | OUTPATIENT
Start: 2023-10-18

## 2023-10-11 RX ORDER — EPINEPHRINE 0.3 MG/.3ML
0.3 INJECTION SUBCUTANEOUS EVERY 5 MIN PRN
Status: DISCONTINUED | OUTPATIENT
Start: 2023-10-11 | End: 2023-10-11 | Stop reason: HOSPADM

## 2023-10-11 RX ORDER — ALBUTEROL SULFATE 0.83 MG/ML
3 SOLUTION RESPIRATORY (INHALATION) AS NEEDED
Status: DISCONTINUED | OUTPATIENT
Start: 2023-10-11 | End: 2023-10-11 | Stop reason: HOSPADM

## 2023-10-11 RX ORDER — FAMOTIDINE 10 MG/ML
20 INJECTION INTRAVENOUS ONCE AS NEEDED
Status: DISCONTINUED | OUTPATIENT
Start: 2023-10-11 | End: 2023-10-11 | Stop reason: HOSPADM

## 2023-10-11 RX ORDER — DIPHENHYDRAMINE HYDROCHLORIDE 50 MG/ML
50 INJECTION INTRAMUSCULAR; INTRAVENOUS AS NEEDED
Status: DISCONTINUED | OUTPATIENT
Start: 2023-10-11 | End: 2023-10-11 | Stop reason: HOSPADM

## 2023-10-11 RX ADMIN — FERUMOXYTOL 510 MG: 510 INJECTION INTRAVENOUS at 10:27

## 2023-10-11 ASSESSMENT — PAIN SCALES - GENERAL: PAINLEVEL: 4

## 2023-10-18 ENCOUNTER — INFUSION (OUTPATIENT)
Dept: HEMATOLOGY/ONCOLOGY | Facility: HOSPITAL | Age: 83
End: 2023-10-18
Payer: MEDICARE

## 2023-10-18 VITALS
RESPIRATION RATE: 16 BRPM | OXYGEN SATURATION: 96 % | TEMPERATURE: 97.2 F | HEART RATE: 72 BPM | SYSTOLIC BLOOD PRESSURE: 135 MMHG | DIASTOLIC BLOOD PRESSURE: 67 MMHG

## 2023-10-18 DIAGNOSIS — D50.8 OTHER IRON DEFICIENCY ANEMIA: ICD-10-CM

## 2023-10-18 PROCEDURE — 96365 THER/PROPH/DIAG IV INF INIT: CPT

## 2023-10-18 PROCEDURE — 2500000004 HC RX 250 GENERAL PHARMACY W/ HCPCS (ALT 636 FOR OP/ED)

## 2023-10-18 RX ORDER — DIPHENHYDRAMINE HYDROCHLORIDE 50 MG/ML
50 INJECTION INTRAMUSCULAR; INTRAVENOUS AS NEEDED
Status: CANCELLED | OUTPATIENT
Start: 2023-10-25

## 2023-10-18 RX ORDER — EPINEPHRINE 0.3 MG/.3ML
0.3 INJECTION SUBCUTANEOUS EVERY 5 MIN PRN
Status: DISCONTINUED | OUTPATIENT
Start: 2023-10-18 | End: 2023-10-18 | Stop reason: HOSPADM

## 2023-10-18 RX ORDER — ALBUTEROL SULFATE 0.83 MG/ML
3 SOLUTION RESPIRATORY (INHALATION) AS NEEDED
Status: DISCONTINUED | OUTPATIENT
Start: 2023-10-18 | End: 2023-10-18 | Stop reason: HOSPADM

## 2023-10-18 RX ORDER — DIPHENHYDRAMINE HYDROCHLORIDE 50 MG/ML
50 INJECTION INTRAMUSCULAR; INTRAVENOUS AS NEEDED
Status: DISCONTINUED | OUTPATIENT
Start: 2023-10-18 | End: 2023-10-18 | Stop reason: HOSPADM

## 2023-10-18 RX ORDER — FAMOTIDINE 10 MG/ML
20 INJECTION INTRAVENOUS ONCE AS NEEDED
Status: CANCELLED | OUTPATIENT
Start: 2023-10-25

## 2023-10-18 RX ORDER — EPINEPHRINE 0.3 MG/.3ML
0.3 INJECTION SUBCUTANEOUS EVERY 5 MIN PRN
Status: CANCELLED | OUTPATIENT
Start: 2023-10-25

## 2023-10-18 RX ORDER — FAMOTIDINE 10 MG/ML
20 INJECTION INTRAVENOUS ONCE AS NEEDED
Status: DISCONTINUED | OUTPATIENT
Start: 2023-10-18 | End: 2023-10-18 | Stop reason: HOSPADM

## 2023-10-18 RX ORDER — ALBUTEROL SULFATE 0.83 MG/ML
3 SOLUTION RESPIRATORY (INHALATION) AS NEEDED
Status: CANCELLED | OUTPATIENT
Start: 2023-10-25

## 2023-10-18 RX ADMIN — FERUMOXYTOL 510 MG: 510 INJECTION INTRAVENOUS at 10:15

## 2023-10-18 SDOH — HEALTH STABILITY: PHYSICAL HEALTH: ON AVERAGE, HOW MANY DAYS PER WEEK DO YOU ENGAGE IN MODERATE TO STRENUOUS EXERCISE (LIKE A BRISK WALK)?: 0 DAYS

## 2023-10-18 SDOH — HEALTH STABILITY: PHYSICAL HEALTH: ON AVERAGE, HOW MANY MINUTES DO YOU ENGAGE IN EXERCISE AT THIS LEVEL?: 0 MIN

## 2023-10-18 ASSESSMENT — PAIN SCALES - GENERAL
PAINLEVEL_OUTOF10: 0-NO PAIN
PAINLEVEL: 0-NO PAIN

## 2023-10-18 ASSESSMENT — ENCOUNTER SYMPTOMS
LOSS OF SENSATION IN FEET: 0
OCCASIONAL FEELINGS OF UNSTEADINESS: 1
DEPRESSION: 0

## 2023-10-18 ASSESSMENT — PATIENT HEALTH QUESTIONNAIRE - PHQ9
1. LITTLE INTEREST OR PLEASURE IN DOING THINGS: NOT AT ALL
SUM OF ALL RESPONSES TO PHQ9 QUESTIONS 1 & 2: 0
2. FEELING DOWN, DEPRESSED OR HOPELESS: NOT AT ALL

## 2023-12-15 PROBLEM — N39.0 UTI (URINARY TRACT INFECTION): Status: ACTIVE | Noted: 2023-12-15

## 2023-12-15 RX ORDER — TRIAMCINOLONE ACETONIDE 1 MG/G
CREAM TOPICAL
COMMUNITY
Start: 2023-08-11

## 2024-01-05 ENCOUNTER — OFFICE VISIT (OUTPATIENT)
Dept: HEMATOLOGY/ONCOLOGY | Facility: HOSPITAL | Age: 84
End: 2024-01-05
Payer: MEDICARE

## 2024-01-05 VITALS
TEMPERATURE: 97.7 F | OXYGEN SATURATION: 96 % | HEART RATE: 70 BPM | HEIGHT: 61 IN | SYSTOLIC BLOOD PRESSURE: 153 MMHG | DIASTOLIC BLOOD PRESSURE: 84 MMHG | RESPIRATION RATE: 18 BRPM | BODY MASS INDEX: 28.64 KG/M2 | WEIGHT: 151.68 LBS

## 2024-01-05 DIAGNOSIS — D50.8 OTHER IRON DEFICIENCY ANEMIA: ICD-10-CM

## 2024-01-05 DIAGNOSIS — D50.9 IRON DEFICIENCY ANEMIA, UNSPECIFIED IRON DEFICIENCY ANEMIA TYPE: ICD-10-CM

## 2024-01-05 DIAGNOSIS — L30.9 ECZEMA, UNSPECIFIED TYPE: Primary | ICD-10-CM

## 2024-01-05 LAB
ALBUMIN SERPL BCP-MCNC: 4 G/DL (ref 3.4–5)
ALP SERPL-CCNC: 138 U/L (ref 33–136)
ALT SERPL W P-5'-P-CCNC: 28 U/L (ref 7–45)
ANION GAP SERPL CALC-SCNC: 13 MMOL/L (ref 10–20)
AST SERPL W P-5'-P-CCNC: 25 U/L (ref 9–39)
BASOPHILS # BLD AUTO: 0.06 X10*3/UL (ref 0–0.1)
BASOPHILS NFR BLD AUTO: 0.7 %
BILIRUB SERPL-MCNC: 0.4 MG/DL (ref 0–1.2)
BUN SERPL-MCNC: 18 MG/DL (ref 6–23)
CALCIUM SERPL-MCNC: 9.5 MG/DL (ref 8.6–10.3)
CHLORIDE SERPL-SCNC: 107 MMOL/L (ref 98–107)
CO2 SERPL-SCNC: 27 MMOL/L (ref 21–32)
CREAT SERPL-MCNC: 0.73 MG/DL (ref 0.5–1.05)
EOSINOPHIL # BLD AUTO: 1.04 X10*3/UL (ref 0–0.4)
EOSINOPHIL NFR BLD AUTO: 11.7 %
ERYTHROCYTE [DISTWIDTH] IN BLOOD BY AUTOMATED COUNT: 19.8 % (ref 11.5–14.5)
FERRITIN SERPL-MCNC: 20 NG/ML (ref 8–150)
FERRITIN SERPL-MCNC: 22 NG/ML (ref 8–150)
GFR SERPL CREATININE-BSD FRML MDRD: 82 ML/MIN/1.73M*2
GLUCOSE SERPL-MCNC: 93 MG/DL (ref 74–99)
HCT VFR BLD AUTO: 39.1 % (ref 36–46)
HGB BLD-MCNC: 11.6 G/DL (ref 12–16)
IMM GRANULOCYTES # BLD AUTO: 0.03 X10*3/UL (ref 0–0.5)
IMM GRANULOCYTES NFR BLD AUTO: 0.3 % (ref 0–0.9)
IRON SATN MFR SERPL: 6 % (ref 25–45)
IRON SATN MFR SERPL: 6 % (ref 25–45)
IRON SERPL-MCNC: 24 UG/DL (ref 35–150)
IRON SERPL-MCNC: 26 UG/DL (ref 35–150)
LYMPHOCYTES # BLD AUTO: 1.26 X10*3/UL (ref 0.8–3)
LYMPHOCYTES NFR BLD AUTO: 14.2 %
MCH RBC QN AUTO: 24.8 PG (ref 26–34)
MCHC RBC AUTO-ENTMCNC: 29.7 G/DL (ref 32–36)
MCV RBC AUTO: 84 FL (ref 80–100)
MONOCYTES # BLD AUTO: 0.76 X10*3/UL (ref 0.05–0.8)
MONOCYTES NFR BLD AUTO: 8.6 %
NEUTROPHILS # BLD AUTO: 5.71 X10*3/UL (ref 1.6–5.5)
NEUTROPHILS NFR BLD AUTO: 64.5 %
NRBC BLD-RTO: 0 /100 WBCS (ref 0–0)
PLATELET # BLD AUTO: 478 X10*3/UL (ref 150–450)
POTASSIUM SERPL-SCNC: 4.4 MMOL/L (ref 3.5–5.3)
PROT SERPL-MCNC: 6.4 G/DL (ref 6.4–8.2)
RBC # BLD AUTO: 4.68 X10*6/UL (ref 4–5.2)
SODIUM SERPL-SCNC: 143 MMOL/L (ref 136–145)
TIBC SERPL-MCNC: 425 UG/DL (ref 240–445)
TIBC SERPL-MCNC: 425 UG/DL (ref 240–445)
UIBC SERPL-MCNC: 399 UG/DL (ref 110–370)
UIBC SERPL-MCNC: 401 UG/DL (ref 110–370)
VIT B12 SERPL-MCNC: 374 PG/ML (ref 211–911)
WBC # BLD AUTO: 8.9 X10*3/UL (ref 4.4–11.3)

## 2024-01-05 PROCEDURE — 36415 COLL VENOUS BLD VENIPUNCTURE: CPT

## 2024-01-05 PROCEDURE — 99214 OFFICE O/P EST MOD 30 MIN: CPT

## 2024-01-05 PROCEDURE — 85025 COMPLETE CBC W/AUTO DIFF WBC: CPT

## 2024-01-05 PROCEDURE — 83540 ASSAY OF IRON: CPT

## 2024-01-05 PROCEDURE — 82728 ASSAY OF FERRITIN: CPT | Mod: MUE

## 2024-01-05 PROCEDURE — 1159F MED LIST DOCD IN RCRD: CPT

## 2024-01-05 PROCEDURE — 1036F TOBACCO NON-USER: CPT

## 2024-01-05 PROCEDURE — 82607 VITAMIN B-12: CPT | Mod: GENLAB

## 2024-01-05 PROCEDURE — 84075 ASSAY ALKALINE PHOSPHATASE: CPT

## 2024-01-05 PROCEDURE — 1126F AMNT PAIN NOTED NONE PRSNT: CPT

## 2024-01-05 RX ORDER — DIPHENHYDRAMINE HYDROCHLORIDE 50 MG/ML
50 INJECTION INTRAMUSCULAR; INTRAVENOUS ONCE
Status: CANCELLED
Start: 2024-01-10 | End: 2024-01-10

## 2024-01-05 RX ORDER — FAMOTIDINE 10 MG/ML
20 INJECTION INTRAVENOUS ONCE AS NEEDED
Status: CANCELLED | OUTPATIENT
Start: 2024-01-10

## 2024-01-05 RX ORDER — DIPHENHYDRAMINE HYDROCHLORIDE 50 MG/ML
50 INJECTION INTRAMUSCULAR; INTRAVENOUS AS NEEDED
Status: CANCELLED | OUTPATIENT
Start: 2024-01-10

## 2024-01-05 RX ORDER — METHYLPREDNISOLONE 4 MG/1
TABLET ORAL
Qty: 21 TABLET | Refills: 0 | Status: SHIPPED | OUTPATIENT
Start: 2024-01-05 | End: 2024-01-12

## 2024-01-05 RX ORDER — ALBUTEROL SULFATE 0.83 MG/ML
3 SOLUTION RESPIRATORY (INHALATION) AS NEEDED
Status: CANCELLED | OUTPATIENT
Start: 2024-01-10

## 2024-01-05 RX ORDER — EPINEPHRINE 0.3 MG/.3ML
0.3 INJECTION SUBCUTANEOUS EVERY 5 MIN PRN
Status: CANCELLED | OUTPATIENT
Start: 2024-01-10

## 2024-01-05 RX ORDER — MUPIROCIN 20 MG/G
OINTMENT TOPICAL
Qty: 22 G | Refills: 0 | Status: SHIPPED | OUTPATIENT
Start: 2024-01-05 | End: 2024-01-15

## 2024-01-05 ASSESSMENT — PAIN SCALES - GENERAL: PAINLEVEL: 0-NO PAIN

## 2024-01-05 NOTE — PROGRESS NOTES
Patient Visit Information:   Visit Type: Benign Heme Follow-up Anemia     Treatment Synopsis:   83-year-old woman with no sig PMH referred for evaluation and management of recurrent microcytic anemia c/w DINH. Patient first noted have microcytic anemia in 7/2020 with H/H 8.1/29.4 MCV 65 with ferritin <8. Patient had EGD/colonoscopy c/w mild gastritis was placed on PPI for 2 months.  She was also placed on oral iron but stopped taking it due to increasing constipation problems (of note, patient has chronic constipation takes Miralax Daily). Patient states that she only took it for 1 week before stopping. In March 2021, repeat CBC showed continued anemia but improved iron studies c/w H/H 10.8/38, MCV 78, ferritin 60, iron sat% 33%.     In may 2021, patient developed SOB/BURROUGHS . CBC on 5/14/2021 showed hemoglobin was 6.6/22.9, MCV 67 with ferritin of 11, and iron sat uncalculatable. Of note, accompanying her anemia is thrombocytosis (527-->693-->521) which likely represents a reactive process. She has had normal WBC's. She was sent to ED and transfused 2 units PRBC. Patient states that prior to ED visit she was in her usual state of health.      Notation from 6/15/2021: Fluid within endometrial cavity of the uterus. Given the postmenopausal status of the patient recommend correlation with pelvic ultrasound to assess for underlying endometrial lesions. Right middle lobe pulmonary nodule demonstrating stability since 2015 benign. Moderate sliding hiatal hernia. Low-density lesion within segment 7 has demonstrated stability since 2015 favoring hepatic cyst.         History of Present Illness:      ID Statement:   CHELSEY FLETCHER is a 83 year old Female     Chief Complaint: Microcytic anemia    Interval History:   Patient presents today for follow-up regarding microcytic and iron deficiency anemia.  Patient states overall she feels very well.  She denies any fever, recent illness, bleeding or bruising, cold hands or feet,  no pica, no abdominal pain, no melena, no bleeding from any location, no bowel or bladder concerns, no cough, no shortness of breath or chest pain.  he has had no nosebleeds or hematemesis.  She has a history of arthritis and does have discomfort in her lower back with excessive movement.  But she continues to do her ADLs throughout the day without difficulty.  She has had left knee issues and swelling and is seeing orthopedic. Additionally, she endorses increased eczema flare up on her arms and chest. She had a recent colonoscopy less than a year and a half ago that did not note any bleeding though did note a hiatal hernia in which she received 6 months of Protonix.  She has had no follow-up since.  May 2022 she had 3 doses of IV Venofer. Feraheme 10/11/2023 and 10/18/2023. Recurrent UTIs, though none recently. Appetite is stable.  She notes a well balanced diet. No weight loss.      Review of Systems:   12 pt ROS was performed, pertinent positive and negative findings as per HPI above, remainder of systems reviewed and are negative.        PMH/PSH:   Chronic constipation, Eczema, macular degeneration, tonsillectomy, appendectomy   FH:   denies any FH of malignancy, bleeding or clotting disorders   Social Hx:   Smoked x 1 year' Worked at [employer]. . Lives in [place]. 3 children.     Allergies and Intolerances:   Allergies   Allergen Reactions    Animal Dander Unknown    Dog Dander Other      Outpatient Medication Profile:  Current Outpatient Medications   Medication Instructions    desoximetasone (Topicort) 0.25 % cream APPLY ONE APPLICATION TOPICALLY TO AFFECTED AREA DAILY    methylPREDNISolone (Medrol Dospak) 4 mg tablets Follow schedule on package instructions    multivit-iron-minerals-folic acid (Centrum Silver) 0.4 mg-300 mcg- 250 mcg tab oral    mupirocin (Bactroban) 2 % ointment Topical, 3 times daily RT    triamcinolone (Kenalog) 0.1 % cream APPLY TO AFFECTED AREA(S) TWICE A DAY FOR 14 DAYS   THEN NEED BREAK FOR 5 DAYS.    vit A/vit C/vit E/zinc/copper (PRESERVISION AREDS ORAL) oral      Past Medical History:   Diagnosis Date    Anesthesia of skin 08/08/2017    Arm numbness    Other constipation 09/23/2021    Chronic constipation    Pain in left hip 06/08/2016    Hip pain, acute, left    Personal history of other diseases of the respiratory system 11/04/2014    History of sinusitis    Personal history of pneumonia (recurrent) 11/04/2014    History of pneumonia    Unspecified cataract 02/13/2019    Cataracts, bilateral      Past Surgical History:   Procedure Laterality Date    APPENDECTOMY  01/20/2014    Appendectomy    TONSILLECTOMY  01/20/2014    Tonsillectomy      Physical Exam:      Constitutional: Well developed, awake/alert/oriented  x3, no distress, alert and cooperative   Eyes: PERRL, EOMI, clear sclera   Respiratory/Thorax: Patent airways, CTAB, normal  breath sounds with good chest expansion, thorax symmetric   Cardiovascular: Regular, rate and rhythm, no murmurs,  2+ equal pulses of the extremities, normal S 1and S 2   Gastrointestinal: Nondistended, soft, non-tender,  no rebound tenderness or guarding, no masses palpable, no organomegaly, +BS, no bruits   Extremities: normal extremities, no cyanosis edema,  contusions or wounds, no clubbing, uses a cane   Neurological: alert and oriented x3, intact senses,  motor, response and reflexes, normal strength   Psychological: Appropriate mood and behavior   Skin: Warm and dry, no lesions, no rashes      Lab Results:  Labs: Reviewed by me  Lab Results   Component Value Date    WBC 8.9 01/05/2024    NEUTROABS 5.71 (H) 01/05/2024    IGABSOL 0.03 01/05/2024    LYMPHSABS 1.26 01/05/2024    MONOSABS 0.76 01/05/2024    EOSABS 1.04 (H) 01/05/2024    BASOSABS 0.06 01/05/2024    RBC 4.68 01/05/2024    MCV 84 01/05/2024    MCHC 29.7 (L) 01/05/2024    HGB 11.6 (L) 01/05/2024    HCT 39.1 01/05/2024     (H) 01/05/2024     Lab Results   Component Value Date     RETICCTPCT 1.7 05/25/2021      Lab Results   Component Value Date    CREATININE 0.73 01/05/2024    BUN 18 01/05/2024    EGFR 82 01/05/2024     01/05/2024    K 4.4 01/05/2024     01/05/2024    CO2 27 01/05/2024      Lab Results   Component Value Date    ALT 28 01/05/2024    AST 25 01/05/2024    ALKPHOS 138 (H) 01/05/2024    BILITOT 0.4 01/05/2024      Lab Results   Component Value Date    TSH 3.07 07/07/2023     Lab Results   Component Value Date    TSH 3.07 07/07/2023     Lab Results   Component Value Date    IRON 24 (L) 01/05/2024    IRON 26 (L) 01/05/2024    TIBC 425 01/05/2024    TIBC 425 01/05/2024    FERRITIN 22 01/05/2024    FERRITIN 20 01/05/2024      Lab Results   Component Value Date    CZPVWIZP74 374 01/05/2024      Lab Results   Component Value Date    FOLATE >24.0 05/25/2021     Lab Results   Component Value Date    STACI NEGATIVE 05/25/2021    RF <10 05/25/2021    SEDRATE 30 05/25/2021     Lab Results   Component Value Date     05/25/2021     Lab Results   Component Value Date    HAPTOGLOBIN 195 05/25/2021     Lab Results   Component Value Date    SPEP NORMAL 09/14/2021     Radiology Result:     Impression:     1. Fluid within endometrial cavity of the uterus. Given the  postmenopausal status of the patient recommend correlation with  pelvic ultrasound to assess for underlying endometrial lesions.  2. Right middle lobe pulmonary nodule demonstrating stability since  2015 benign  3. Fatty atrophy of the bilateral gluteus medius musculature  4. Moderate sliding hiatal hernia  5. Low-density lesion withinsegment 7 has demonstrated stability  since 2015 favoring hepatic cyst.      CT Abdomen and Pelvis with IV Contrast [Jun 16 2021 11:14PM]     Assessment and Plan:   Patient is an 83-year-old woman with no significant PMH referred for follow-up and management of recurrent microcytic anemia c/w DINH likely from GI blood loss or malabsorption due to hiatal hernia.  Today her hemoglobin is  11.6 iron Tsat 6 and ferritin 20, improved from prior.  Patient elected for IV iron due to occasional constipation unless on MiraLAX.  IV Feraheme ordered x2 doses to start next week.      Previous imaging show moderate sliding hiatal hernia; asymptomatic, PPI finished after 6 months of prescription.  No follow-up planned with GI.  Recommended follow-up.     Fluid within endometrial cavity of the uterus. Endometrial thickening but no PMB.   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 Will reassess these labs at next visit.     7/2020: 5 cm hiatal hernia, EGD with  LA Grade B reflux esophagitis and gastritis biopsied and was negative and Colonoscopy unremarkable.   6/15/2021 Mammogram no evidence of malignancy     Future IV iron to be given with with premeds benadryl and Hydrocortisone (h/o Eczema and notice worsening pruritis after each Venofer infusion in past)      Follow-Up:     RTC   -1 week for IV iron with premeds.   -3 months with repeat labs and possible IV iron.     Medications:  -Medrol dose pack  -Continue topical steroids as prescribed by PCP  -Eucerin cream after bathing to absorb     Referral:  -GI for consideration of repeat scopes +/- video capsule due to HH  -PCP as needed  -Follow up with Derm    Instruction Summary:  Reviewed and discussed lab, imaging, and pathology results with patient in detail as well as diagnosis, prognosis, and treatment options. She is concerned that DINH is continuing. Educated on malabsorption as we age. Recommend GI workup. Discussed follow up with derm.  Patient verbalized understanding, and all her questions were answered to her satisfaction.

## 2024-01-08 DIAGNOSIS — L30.9 ECZEMA, UNSPECIFIED TYPE: ICD-10-CM

## 2024-01-08 DIAGNOSIS — D50.8 OTHER IRON DEFICIENCY ANEMIA: ICD-10-CM

## 2024-01-08 RX ORDER — MUPIROCIN 20 MG/G
OINTMENT TOPICAL
Qty: 22 G | Refills: 0 | Status: CANCELLED | OUTPATIENT
Start: 2024-01-08 | End: 2024-01-18

## 2024-01-08 NOTE — TELEPHONE ENCOUNTER
called in, states there is not enough of the ointment to last for 10 days, states it was just a tiny tube of the ointment. Requesting refill, possibly bigger tube.

## 2024-01-10 DIAGNOSIS — L30.9 ECZEMA, UNSPECIFIED TYPE: Primary | ICD-10-CM

## 2024-01-10 DIAGNOSIS — L57.8 ACTINIC DERMATITIS: ICD-10-CM

## 2024-01-10 RX ORDER — MUPIROCIN 20 MG/G
OINTMENT TOPICAL
Qty: 30 G | Refills: 0 | Status: SHIPPED | OUTPATIENT
Start: 2024-01-10 | End: 2024-01-20

## 2024-01-12 ENCOUNTER — INFUSION (OUTPATIENT)
Dept: HEMATOLOGY/ONCOLOGY | Facility: HOSPITAL | Age: 84
End: 2024-01-12
Payer: MEDICARE

## 2024-01-12 VITALS
TEMPERATURE: 97.9 F | RESPIRATION RATE: 18 BRPM | SYSTOLIC BLOOD PRESSURE: 129 MMHG | DIASTOLIC BLOOD PRESSURE: 81 MMHG | OXYGEN SATURATION: 96 % | HEART RATE: 75 BPM

## 2024-01-12 DIAGNOSIS — K90.9 IMPAIRED INTESTINAL ABSORPTION (HHS-HCC): ICD-10-CM

## 2024-01-12 DIAGNOSIS — D50.8 OTHER IRON DEFICIENCY ANEMIA: ICD-10-CM

## 2024-01-12 DIAGNOSIS — D50.9 IRON DEFICIENCY ANEMIA, UNSPECIFIED IRON DEFICIENCY ANEMIA TYPE: ICD-10-CM

## 2024-01-12 DIAGNOSIS — E61.1 IRON DEFICIENCY: ICD-10-CM

## 2024-01-12 PROCEDURE — 2500000004 HC RX 250 GENERAL PHARMACY W/ HCPCS (ALT 636 FOR OP/ED): Mod: JZ

## 2024-01-12 PROCEDURE — 96365 THER/PROPH/DIAG IV INF INIT: CPT | Mod: INF

## 2024-01-12 RX ORDER — DIPHENHYDRAMINE HYDROCHLORIDE 50 MG/ML
50 INJECTION INTRAMUSCULAR; INTRAVENOUS AS NEEDED
Status: CANCELLED | OUTPATIENT
Start: 2024-01-14

## 2024-01-12 RX ORDER — ALBUTEROL SULFATE 0.83 MG/ML
3 SOLUTION RESPIRATORY (INHALATION) AS NEEDED
Status: DISCONTINUED | OUTPATIENT
Start: 2024-01-12 | End: 2024-01-12 | Stop reason: HOSPADM

## 2024-01-12 RX ORDER — HEPARIN SODIUM,PORCINE/PF 10 UNIT/ML
50 SYRINGE (ML) INTRAVENOUS AS NEEDED
Status: CANCELLED | OUTPATIENT
Start: 2024-01-12

## 2024-01-12 RX ORDER — HEPARIN 100 UNIT/ML
500 SYRINGE INTRAVENOUS AS NEEDED
Status: CANCELLED | OUTPATIENT
Start: 2024-01-12

## 2024-01-12 RX ORDER — HEPARIN SODIUM 1000 [USP'U]/ML
2000 INJECTION, SOLUTION INTRAVENOUS; SUBCUTANEOUS AS NEEDED
Status: CANCELLED | OUTPATIENT
Start: 2024-01-12

## 2024-01-12 RX ORDER — EPINEPHRINE 0.3 MG/.3ML
0.3 INJECTION SUBCUTANEOUS EVERY 5 MIN PRN
Status: DISCONTINUED | OUTPATIENT
Start: 2024-01-12 | End: 2024-01-12 | Stop reason: HOSPADM

## 2024-01-12 RX ORDER — DIPHENHYDRAMINE HYDROCHLORIDE 50 MG/ML
50 INJECTION INTRAMUSCULAR; INTRAVENOUS ONCE
Status: DISCONTINUED | OUTPATIENT
Start: 2024-01-12 | End: 2024-01-12 | Stop reason: HOSPADM

## 2024-01-12 RX ORDER — DIPHENHYDRAMINE HYDROCHLORIDE 50 MG/ML
50 INJECTION INTRAMUSCULAR; INTRAVENOUS ONCE
Status: CANCELLED
Start: 2024-01-14 | End: 2024-01-14

## 2024-01-12 RX ORDER — FAMOTIDINE 10 MG/ML
20 INJECTION INTRAVENOUS ONCE AS NEEDED
Status: CANCELLED | OUTPATIENT
Start: 2024-01-14

## 2024-01-12 RX ORDER — EPINEPHRINE 0.3 MG/.3ML
0.3 INJECTION SUBCUTANEOUS EVERY 5 MIN PRN
Status: CANCELLED | OUTPATIENT
Start: 2024-01-14

## 2024-01-12 RX ORDER — HEPARIN SODIUM 1000 [USP'U]/ML
2000 INJECTION, SOLUTION INTRAVENOUS; SUBCUTANEOUS AS NEEDED
Status: DISCONTINUED | OUTPATIENT
Start: 2024-01-12 | End: 2024-01-12 | Stop reason: HOSPADM

## 2024-01-12 RX ORDER — DIPHENHYDRAMINE HYDROCHLORIDE 50 MG/ML
50 INJECTION INTRAMUSCULAR; INTRAVENOUS AS NEEDED
Status: DISCONTINUED | OUTPATIENT
Start: 2024-01-12 | End: 2024-01-12 | Stop reason: HOSPADM

## 2024-01-12 RX ORDER — ALBUTEROL SULFATE 0.83 MG/ML
3 SOLUTION RESPIRATORY (INHALATION) AS NEEDED
Status: CANCELLED | OUTPATIENT
Start: 2024-01-14

## 2024-01-12 RX ORDER — FAMOTIDINE 10 MG/ML
20 INJECTION INTRAVENOUS ONCE AS NEEDED
Status: DISCONTINUED | OUTPATIENT
Start: 2024-01-12 | End: 2024-01-12 | Stop reason: HOSPADM

## 2024-01-12 RX ADMIN — FERUMOXYTOL 510 MG: 510 INJECTION INTRAVENOUS at 10:19

## 2024-01-12 ASSESSMENT — ENCOUNTER SYMPTOMS
DEPRESSION: 0
LOSS OF SENSATION IN FEET: 0
OCCASIONAL FEELINGS OF UNSTEADINESS: 1

## 2024-01-12 ASSESSMENT — PATIENT HEALTH QUESTIONNAIRE - PHQ9
1. LITTLE INTEREST OR PLEASURE IN DOING THINGS: NOT AT ALL
SUM OF ALL RESPONSES TO PHQ9 QUESTIONS 1 AND 2: 0
2. FEELING DOWN, DEPRESSED OR HOPELESS: NOT AT ALL

## 2024-01-12 ASSESSMENT — COLUMBIA-SUICIDE SEVERITY RATING SCALE - C-SSRS
6. HAVE YOU EVER DONE ANYTHING, STARTED TO DO ANYTHING, OR PREPARED TO DO ANYTHING TO END YOUR LIFE?: NO
2. HAVE YOU ACTUALLY HAD ANY THOUGHTS OF KILLING YOURSELF?: NO
1. IN THE PAST MONTH, HAVE YOU WISHED YOU WERE DEAD OR WISHED YOU COULD GO TO SLEEP AND NOT WAKE UP?: NO

## 2024-01-12 ASSESSMENT — PAIN SCALES - GENERAL
PAINLEVEL: 0-NO PAIN
PAINLEVEL_OUTOF10: 0-NO PAIN

## 2024-01-12 NOTE — PROGRESS NOTES
1/12/24 1004- pt refused pre medications, states I do not take premedications when I get feraheme. Stacey Arnold NP notified

## 2024-01-19 ENCOUNTER — INFUSION (OUTPATIENT)
Dept: HEMATOLOGY/ONCOLOGY | Facility: HOSPITAL | Age: 84
End: 2024-01-19
Payer: MEDICARE

## 2024-01-19 VITALS
SYSTOLIC BLOOD PRESSURE: 132 MMHG | RESPIRATION RATE: 18 BRPM | TEMPERATURE: 97.2 F | HEART RATE: 73 BPM | WEIGHT: 149.91 LBS | BODY MASS INDEX: 28.33 KG/M2 | OXYGEN SATURATION: 95 % | DIASTOLIC BLOOD PRESSURE: 69 MMHG

## 2024-01-19 DIAGNOSIS — E61.1 IRON DEFICIENCY: ICD-10-CM

## 2024-01-19 DIAGNOSIS — D50.8 OTHER IRON DEFICIENCY ANEMIA: ICD-10-CM

## 2024-01-19 DIAGNOSIS — K90.9 IMPAIRED INTESTINAL ABSORPTION (HHS-HCC): ICD-10-CM

## 2024-01-19 PROCEDURE — 96365 THER/PROPH/DIAG IV INF INIT: CPT | Mod: INF

## 2024-01-19 PROCEDURE — 2500000004 HC RX 250 GENERAL PHARMACY W/ HCPCS (ALT 636 FOR OP/ED)

## 2024-01-19 RX ORDER — HEPARIN SODIUM,PORCINE/PF 10 UNIT/ML
50 SYRINGE (ML) INTRAVENOUS AS NEEDED
OUTPATIENT
Start: 2024-01-19

## 2024-01-19 RX ORDER — FAMOTIDINE 10 MG/ML
20 INJECTION INTRAVENOUS ONCE AS NEEDED
OUTPATIENT
Start: 2024-01-21

## 2024-01-19 RX ORDER — DIPHENHYDRAMINE HYDROCHLORIDE 50 MG/ML
50 INJECTION INTRAMUSCULAR; INTRAVENOUS ONCE
Status: DISCONTINUED | OUTPATIENT
Start: 2024-01-19 | End: 2024-01-19 | Stop reason: HOSPADM

## 2024-01-19 RX ORDER — ALBUTEROL SULFATE 0.83 MG/ML
3 SOLUTION RESPIRATORY (INHALATION) AS NEEDED
Status: DISCONTINUED | OUTPATIENT
Start: 2024-01-19 | End: 2024-01-19 | Stop reason: HOSPADM

## 2024-01-19 RX ORDER — EPINEPHRINE 0.3 MG/.3ML
0.3 INJECTION SUBCUTANEOUS EVERY 5 MIN PRN
OUTPATIENT
Start: 2024-01-21

## 2024-01-19 RX ORDER — DIPHENHYDRAMINE HYDROCHLORIDE 50 MG/ML
50 INJECTION INTRAMUSCULAR; INTRAVENOUS AS NEEDED
Status: DISCONTINUED | OUTPATIENT
Start: 2024-01-19 | End: 2024-01-19 | Stop reason: HOSPADM

## 2024-01-19 RX ORDER — HEPARIN 100 UNIT/ML
500 SYRINGE INTRAVENOUS AS NEEDED
OUTPATIENT
Start: 2024-01-19

## 2024-01-19 RX ORDER — EPINEPHRINE 0.3 MG/.3ML
0.3 INJECTION SUBCUTANEOUS EVERY 5 MIN PRN
Status: DISCONTINUED | OUTPATIENT
Start: 2024-01-19 | End: 2024-01-19 | Stop reason: HOSPADM

## 2024-01-19 RX ORDER — HEPARIN SODIUM,PORCINE/PF 10 UNIT/ML
50 SYRINGE (ML) INTRAVENOUS AS NEEDED
Status: DISCONTINUED | OUTPATIENT
Start: 2024-01-19 | End: 2024-01-19 | Stop reason: HOSPADM

## 2024-01-19 RX ORDER — ALBUTEROL SULFATE 0.83 MG/ML
3 SOLUTION RESPIRATORY (INHALATION) AS NEEDED
OUTPATIENT
Start: 2024-01-21

## 2024-01-19 RX ORDER — DIPHENHYDRAMINE HYDROCHLORIDE 50 MG/ML
50 INJECTION INTRAMUSCULAR; INTRAVENOUS ONCE
Start: 2024-01-21 | End: 2024-01-21

## 2024-01-19 RX ORDER — DIPHENHYDRAMINE HYDROCHLORIDE 50 MG/ML
50 INJECTION INTRAMUSCULAR; INTRAVENOUS AS NEEDED
OUTPATIENT
Start: 2024-01-21

## 2024-01-19 RX ORDER — HEPARIN SODIUM 1000 [USP'U]/ML
2000 INJECTION, SOLUTION INTRAVENOUS; SUBCUTANEOUS AS NEEDED
OUTPATIENT
Start: 2024-01-19

## 2024-01-19 RX ORDER — HEPARIN SODIUM 1000 [USP'U]/ML
2000 INJECTION, SOLUTION INTRAVENOUS; SUBCUTANEOUS AS NEEDED
Status: DISCONTINUED | OUTPATIENT
Start: 2024-01-19 | End: 2024-01-19 | Stop reason: HOSPADM

## 2024-01-19 RX ORDER — FAMOTIDINE 10 MG/ML
20 INJECTION INTRAVENOUS ONCE AS NEEDED
Status: DISCONTINUED | OUTPATIENT
Start: 2024-01-19 | End: 2024-01-19 | Stop reason: HOSPADM

## 2024-01-19 RX ADMIN — FERUMOXYTOL 510 MG: 510 INJECTION INTRAVENOUS at 09:44

## 2024-01-19 ASSESSMENT — PATIENT HEALTH QUESTIONNAIRE - PHQ9
SUM OF ALL RESPONSES TO PHQ9 QUESTIONS 1 & 2: 0
1. LITTLE INTEREST OR PLEASURE IN DOING THINGS: NOT AT ALL
2. FEELING DOWN, DEPRESSED OR HOPELESS: NOT AT ALL

## 2024-01-19 ASSESSMENT — PAIN SCALES - GENERAL: PAINLEVEL: 5

## 2024-03-02 NOTE — PROGRESS NOTES
Patient Visit Information:   Visit Type: Benign Heme Follow-up Iron Deficiency Anemia     Treatment Synopsis:   83-year-old woman with no sig PMH referred for evaluation and management of recurrent microcytic anemia c/w DINH. Patient first noted have microcytic anemia in 7/2020 with H/H 8.1/29.4 MCV 65 with ferritin <8. Patient had EGD/colonoscopy c/w mild gastritis was placed on PPI for 2 months.  She was also placed on oral iron but stopped taking it due to increasing constipation problems (of note, patient has chronic constipation takes Miralax Daily). Patient states that she only took it for 1 week before stopping. In March 2021, repeat CBC showed continued anemia but improved iron studies c/w H/H 10.8/38, MCV 78, ferritin 60, iron sat% 33%.     In may 2021, patient developed SOB/BURROUGHS . CBC on 5/14/2021 showed hemoglobin was 6.6/22.9, MCV 67 with ferritin of 11, and iron sat uncalculatable. Of note, accompanying her anemia is thrombocytosis (527-->693-->521) which likely represents a reactive process. She has had normal WBC's. She was sent to ED and transfused 2 units PRBC. Patient states that prior to ED visit she was in her usual state of health.      Notation from 6/15/2021: Fluid within endometrial cavity of the uterus. Given the postmenopausal status of the patient recommend correlation with pelvic ultrasound to assess for underlying endometrial lesions. Right middle lobe pulmonary nodule demonstrating stability since 2015 benign. Moderate sliding hiatal hernia. Low-density lesion within segment 7 has demonstrated stability since 2015 favoring hepatic cyst.      January 2024 IV iron administered. Today she is here to reassess iron infusion intervention.      History of Present Illness:      ID Statement:   CHELSEY FLETCHER is a 83 year old Female     Chief Complaint: Microcytic anemia, DINH    Interval History:   Patient presents with  today for follow-up regarding microcytic and iron deficiency  anemia.  Patient states overall she feels very well.  She denies any feeling of fatigue any different from before iron.      She denies any fever, recent illness, bleeding or bruising, cold hands or feet, no pica, no abdominal pain, no melena, no bleeding from any location, no bowel or bladder concerns, no cough, no shortness of breath or chest pain. She has had no nosebleeds or hematemesis.  She has a history of arthritis and does have discomfort in her lower back with excessive movement.  But she continues to do her ADLs throughout the day without difficulty.  She has had left knee issues and swelling and is seeing orthopedic.   Additionally, she has eczema on her arms and chest. Improved from last visit.     Colonoscopy completed less than a year and a half ago that did not note any bleeding though did note a hiatal hernia in which she received 6 months of Protonix.  She has had no follow-up since.  May 2022 she had 3 doses of IV Venofer. Feraheme 10/11/2023 and 10/18/2023. Feraheme X3 does in January 2024.      Review of Systems:   12 pt ROS was performed, pertinent positive and negative findings as per HPI above, remainder of systems reviewed and are negative.   Review of Systems   Constitutional:  Positive for fatigue and unexpected weight change.        Some weight los, says she wants to keep it manageable.   HENT: Negative.     Eyes: Negative.    Respiratory: Negative.     Cardiovascular: Negative.    Gastrointestinal: Negative.    Endocrine: Negative.    Genitourinary: Negative.    Musculoskeletal:  Positive for back pain.        Left knee pain   Skin: Negative.    Allergic/Immunologic: Positive for environmental allergies.   Hematological: Negative.    Psychiatric/Behavioral: Negative.        PMH/PSH:   -DINH  -Microcytic Anemia  -Chronic constipation  -Eczema  -Macular degeneration  -Tonsillectomy  -Appendectomy   -Arthritis and degenerative changes     FH:   -Denies any FH of malignancy, bleeding or  clotting disorders that she is aware    Social Hx:   Smoked x 1 year, no alcohol, no drugs, worked at [employer]. . Lives in [place]. 3 adult children.     Allergies and Intolerances:   Allergies   Allergen Reactions    Animal Dander Unknown    Dog Dander Other      Outpatient Medication Profile:  Current Outpatient Medications   Medication Instructions    desoximetasone (Topicort) 0.25 % cream APPLY ONE APPLICATION TOPICALLY TO AFFECTED AREA DAILY    multivit-iron-minerals-folic acid (Centrum Silver) 0.4 mg-300 mcg- 250 mcg tab oral    triamcinolone (Kenalog) 0.1 % cream APPLY TO AFFECTED AREA(S) TWICE A DAY FOR 14 DAYS  THEN NEED BREAK FOR 5 DAYS.    vit A/vit C/vit E/zinc/copper (PRESERVISION AREDS ORAL) oral      Past Medical History:   Diagnosis Date    Anesthesia of skin 08/08/2017    Arm numbness    Other constipation 09/23/2021    Chronic constipation    Pain in left hip 06/08/2016    Hip pain, acute, left    Personal history of other diseases of the respiratory system 11/04/2014    History of sinusitis    Personal history of pneumonia (recurrent) 11/04/2014    History of pneumonia    Unspecified cataract 02/13/2019    Cataracts, bilateral      Past Surgical History:   Procedure Laterality Date    APPENDECTOMY  01/20/2014    Appendectomy    TONSILLECTOMY  01/20/2014    Tonsillectomy      Physical Exam:      Constitutional: Well developed, awake/alert/oriented  x3, no distress, alert and cooperative   Eyes: PERRL, EOMI, clear sclera   Respiratory/Thorax: Patent airways, CTAB, normal  breath sounds with good chest expansion, thorax symmetric   Cardiovascular: Regular, rate and rhythm, no murmurs,  2+ equal pulses of the extremities, normal S 1and S 2   Gastrointestinal: Nondistended, soft, non-tender,  no rebound tenderness or guarding, no masses palpable, no organomegaly, +BS, no bruits   Extremities: normal extremities, no cyanosis edema,  contusions or wounds, no clubbing, uses a cane    Neurological: alert and oriented x3, intact senses,  motor, response and reflexes, normal strength   Psychological: Appropriate mood and behavior   Skin: Warm and dry, no lesions, no rashes, pink      Lab Results:  Labs: Reviewed by me  Lab Results   Component Value Date    WBC 8.9 01/05/2024    NEUTROABS 5.71 (H) 01/05/2024    IGABSOL 0.03 01/05/2024    LYMPHSABS 1.26 01/05/2024    MONOSABS 0.76 01/05/2024    EOSABS 1.04 (H) 01/05/2024    BASOSABS 0.06 01/05/2024    RBC 4.68 01/05/2024    MCV 84 01/05/2024    MCHC 29.7 (L) 01/05/2024    HGB 11.6 (L) 01/05/2024    HCT 39.1 01/05/2024     (H) 01/05/2024     Lab Results   Component Value Date    RETICCTPCT 1.7 05/25/2021      Lab Results   Component Value Date    CREATININE 0.73 01/05/2024    BUN 18 01/05/2024    EGFR 82 01/05/2024     01/05/2024    K 4.4 01/05/2024     01/05/2024    CO2 27 01/05/2024      Lab Results   Component Value Date    ALT 28 01/05/2024    AST 25 01/05/2024    ALKPHOS 138 (H) 01/05/2024    BILITOT 0.4 01/05/2024      Lab Results   Component Value Date    TSH 3.07 07/07/2023     Lab Results   Component Value Date    TSH 3.07 07/07/2023     Lab Results   Component Value Date    IRON 24 (L) 01/05/2024    IRON 26 (L) 01/05/2024    TIBC 425 01/05/2024    TIBC 425 01/05/2024    FERRITIN 22 01/05/2024    FERRITIN 20 01/05/2024      Lab Results   Component Value Date    CXRZPLGQ06 374 01/05/2024      Lab Results   Component Value Date    FOLATE >24.0 05/25/2021     Lab Results   Component Value Date    STACI NEGATIVE 05/25/2021    RF <10 05/25/2021    SEDRATE 30 05/25/2021     Lab Results   Component Value Date     05/25/2021     Lab Results   Component Value Date    HAPTOGLOBIN 195 05/25/2021     Lab Results   Component Value Date    SPEP NORMAL 09/14/2021     Radiology Result:  Impression:  1. Fluid within endometrial cavity of the uterus. Given the  postmenopausal status of the patient recommend correlation with  pelvic  ultrasound to assess for underlying endometrial lesions.  2. Right middle lobe pulmonary nodule demonstrating stability since  2015 benign  3. Fatty atrophy of the bilateral gluteus medius musculature  4. Moderate sliding hiatal hernia  5. Low-density lesion withinsegment 7 has demonstrated stability  since 2015 favoring hepatic cyst.  CT Abdomen and Pelvis with IV Contrast [Jun 16 2021 11:14PM]     Assessment and Plan:   Patient is an 83-year-old woman with no significant PMH referred for follow-up and management of recurrent microcytic anemia c/w DINH likely from GI blood loss or malabsorption due to hiatal hernia. Previous visit 1/2024 her hemoglobin is 11.6 iron Tsat 6 and ferritin 20, improved from prior.  Patient elected for IV iron due to occasional constipation unless on MiraLAX.  IV Feraheme ordered X3 doses January 2024.      Previous imaging show moderate sliding hiatal hernia; asymptomatic, PPI finished after 6 months of prescription.  No follow-up planned with GI.  Recommended follow-up if DINH persists.      Today, 3/4/2024 labs pending.  Fluid within endometrial cavity of the uterus. Endometrial thickening but no PMB.   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      7/2020: 5 cm hiatal hernia, EGD with  LA Grade B reflux esophagitis and gastritis biopsied and was negative and Colonoscopy unremarkable.     6/15/2021 Mammogram no evidence of malignancy. New order not scheduled as of today.      Future IV iron to be given with with premeds benadryl and Hydrocortisone (h/o Eczema and notice worsening pruritis after each Venofer infusion in past).     Follow-Up:     RTC   -3 months in person     Medications:  -Eucerin cream after bathing to absorb     Referral:  -PCP as needed  -Follow up with Derm as needed  -GI for malabsorption     Instruction Summary:  Reviewed and discussed lab, imaging, and pathology results with patient in detail as well  as diagnosis, prognosis, and treatment options. Educated on malabsorption as we age. Recommend GI workup and continued follow up with GI if iron deficiency persists.  Discussed follow up with derm. And she is not interested at this time. Improved eczema.   Patient verbalized understanding, and all her questions were answered to her satisfaction.      Thank you for allowing me to participate in your care.    Sincerely,  GRETA Juarez         Written by voice recognition software. Please ask for clarification as needed.

## 2024-03-04 ENCOUNTER — OFFICE VISIT (OUTPATIENT)
Dept: HEMATOLOGY/ONCOLOGY | Facility: HOSPITAL | Age: 84
End: 2024-03-04
Payer: MEDICARE

## 2024-03-04 VITALS
WEIGHT: 148.48 LBS | DIASTOLIC BLOOD PRESSURE: 74 MMHG | HEART RATE: 79 BPM | OXYGEN SATURATION: 95 % | RESPIRATION RATE: 16 BRPM | SYSTOLIC BLOOD PRESSURE: 119 MMHG | HEIGHT: 61 IN | BODY MASS INDEX: 28.03 KG/M2 | TEMPERATURE: 98.6 F

## 2024-03-04 DIAGNOSIS — D50.9 IRON DEFICIENCY ANEMIA, UNSPECIFIED IRON DEFICIENCY ANEMIA TYPE: Primary | ICD-10-CM

## 2024-03-04 LAB
ALBUMIN SERPL BCP-MCNC: 3.9 G/DL (ref 3.4–5)
ALP SERPL-CCNC: 105 U/L (ref 33–136)
ALT SERPL W P-5'-P-CCNC: 14 U/L (ref 7–45)
ANION GAP SERPL CALC-SCNC: 12 MMOL/L (ref 10–20)
AST SERPL W P-5'-P-CCNC: 20 U/L (ref 9–39)
BASOPHILS # BLD AUTO: 0.08 X10*3/UL (ref 0–0.1)
BASOPHILS NFR BLD AUTO: 1 %
BILIRUB DIRECT SERPL-MCNC: 0.1 MG/DL (ref 0–0.3)
BILIRUB SERPL-MCNC: 0.4 MG/DL (ref 0–1.2)
BUN SERPL-MCNC: 18 MG/DL (ref 6–23)
CALCIUM SERPL-MCNC: 9.2 MG/DL (ref 8.6–10.3)
CHLORIDE SERPL-SCNC: 106 MMOL/L (ref 98–107)
CO2 SERPL-SCNC: 29 MMOL/L (ref 21–32)
CREAT SERPL-MCNC: 0.7 MG/DL (ref 0.5–1.05)
CRP SERPL-MCNC: 0.41 MG/DL
DAT-POLYSPECIFIC: NORMAL
EGFRCR SERPLBLD CKD-EPI 2021: 86 ML/MIN/1.73M*2
EOSINOPHIL # BLD AUTO: 0.56 X10*3/UL (ref 0–0.4)
EOSINOPHIL NFR BLD AUTO: 7.3 %
ERYTHROCYTE [DISTWIDTH] IN BLOOD BY AUTOMATED COUNT: 17.3 % (ref 11.5–14.5)
ERYTHROCYTE [SEDIMENTATION RATE] IN BLOOD BY WESTERGREN METHOD: 8 MM/H (ref 0–30)
FERRITIN SERPL-MCNC: 114 NG/ML (ref 8–150)
FOLATE SERPL-MCNC: 15.3 NG/ML
GLUCOSE SERPL-MCNC: 84 MG/DL (ref 74–99)
HCT VFR BLD AUTO: 43.2 % (ref 36–46)
HGB BLD-MCNC: 13.5 G/DL (ref 12–16)
HGB RETIC QN: 31 PG (ref 28–38)
IMM GRANULOCYTES # BLD AUTO: 0.02 X10*3/UL (ref 0–0.5)
IMM GRANULOCYTES NFR BLD AUTO: 0.3 % (ref 0–0.9)
IMMATURE RETIC FRACTION: 9.1 %
IRON SATN MFR SERPL: 26 % (ref 25–45)
IRON SERPL-MCNC: 81 UG/DL (ref 35–150)
LDH SERPL L TO P-CCNC: 191 U/L (ref 84–246)
LYMPHOCYTES # BLD AUTO: 1.15 X10*3/UL (ref 0.8–3)
LYMPHOCYTES NFR BLD AUTO: 14.9 %
MCH RBC QN AUTO: 27.1 PG (ref 26–34)
MCHC RBC AUTO-ENTMCNC: 31.3 G/DL (ref 32–36)
MCV RBC AUTO: 87 FL (ref 80–100)
MONOCYTES # BLD AUTO: 0.55 X10*3/UL (ref 0.05–0.8)
MONOCYTES NFR BLD AUTO: 7.1 %
NEUTROPHILS # BLD AUTO: 5.34 X10*3/UL (ref 1.6–5.5)
NEUTROPHILS NFR BLD AUTO: 69.4 %
NRBC BLD-RTO: 0 /100 WBCS (ref 0–0)
PLATELET # BLD AUTO: 395 X10*3/UL (ref 150–450)
POTASSIUM SERPL-SCNC: 4.1 MMOL/L (ref 3.5–5.3)
PROT SERPL-MCNC: 6.3 G/DL (ref 6.4–8.2)
PROT SERPL-MCNC: 6.4 G/DL (ref 6.4–8.2)
RBC # BLD AUTO: 4.98 X10*6/UL (ref 4–5.2)
RETICS #: 0.07 X10*6/UL (ref 0.02–0.11)
RETICS/RBC NFR AUTO: 1.4 % (ref 0.5–2)
SODIUM SERPL-SCNC: 143 MMOL/L (ref 136–145)
TIBC SERPL-MCNC: 308 UG/DL (ref 240–445)
UIBC SERPL-MCNC: 227 UG/DL (ref 110–370)
VIT B12 SERPL-MCNC: 330 PG/ML (ref 211–911)
WBC # BLD AUTO: 7.7 X10*3/UL (ref 4.4–11.3)

## 2024-03-04 PROCEDURE — 82746 ASSAY OF FOLIC ACID SERUM: CPT | Mod: GENLAB

## 2024-03-04 PROCEDURE — 36415 COLL VENOUS BLD VENIPUNCTURE: CPT

## 2024-03-04 PROCEDURE — 86880 COOMBS TEST DIRECT: CPT

## 2024-03-04 PROCEDURE — 85045 AUTOMATED RETICULOCYTE COUNT: CPT

## 2024-03-04 PROCEDURE — 86255 FLUORESCENT ANTIBODY SCREEN: CPT

## 2024-03-04 PROCEDURE — 82960 TEST FOR G6PD ENZYME: CPT | Mod: GENLAB

## 2024-03-04 PROCEDURE — 86320 SERUM IMMUNOELECTROPHORESIS: CPT

## 2024-03-04 PROCEDURE — 82248 BILIRUBIN DIRECT: CPT

## 2024-03-04 PROCEDURE — 1036F TOBACCO NON-USER: CPT

## 2024-03-04 PROCEDURE — 83615 LACTATE (LD) (LDH) ENZYME: CPT

## 2024-03-04 PROCEDURE — 83010 ASSAY OF HAPTOGLOBIN QUANT: CPT

## 2024-03-04 PROCEDURE — 84165 PROTEIN E-PHORESIS SERUM: CPT

## 2024-03-04 PROCEDURE — 86340 INTRINSIC FACTOR ANTIBODY: CPT

## 2024-03-04 PROCEDURE — 1159F MED LIST DOCD IN RCRD: CPT

## 2024-03-04 PROCEDURE — 99214 OFFICE O/P EST MOD 30 MIN: CPT

## 2024-03-04 PROCEDURE — 85652 RBC SED RATE AUTOMATED: CPT

## 2024-03-04 PROCEDURE — 85025 COMPLETE CBC W/AUTO DIFF WBC: CPT

## 2024-03-04 PROCEDURE — 86140 C-REACTIVE PROTEIN: CPT

## 2024-03-04 PROCEDURE — 80053 COMPREHEN METABOLIC PANEL: CPT

## 2024-03-04 PROCEDURE — 82607 VITAMIN B-12: CPT | Mod: GENLAB

## 2024-03-04 PROCEDURE — 1125F AMNT PAIN NOTED PAIN PRSNT: CPT

## 2024-03-04 PROCEDURE — 84155 ASSAY OF PROTEIN SERUM: CPT | Mod: 59,GENLAB

## 2024-03-04 PROCEDURE — 82728 ASSAY OF FERRITIN: CPT

## 2024-03-04 PROCEDURE — 84165 PROTEIN E-PHORESIS SERUM: CPT | Mod: GENLAB

## 2024-03-04 PROCEDURE — 83540 ASSAY OF IRON: CPT

## 2024-03-04 ASSESSMENT — ENCOUNTER SYMPTOMS
ENDOCRINE NEGATIVE: 1
UNEXPECTED WEIGHT CHANGE: 1
CARDIOVASCULAR NEGATIVE: 1
HEMATOLOGIC/LYMPHATIC NEGATIVE: 1
BACK PAIN: 1
EYES NEGATIVE: 1
FATIGUE: 1
RESPIRATORY NEGATIVE: 1
PSYCHIATRIC NEGATIVE: 1
GASTROINTESTINAL NEGATIVE: 1

## 2024-03-04 ASSESSMENT — PAIN SCALES - GENERAL: PAINLEVEL: 5

## 2024-03-05 LAB
G6PD RBC QL: NORMAL
HAPTOGLOB SERPL-MCNC: 205 MG/DL (ref 37–246)

## 2024-03-06 LAB
ALBUMIN: 3.6 G/DL (ref 3.4–5)
ALPHA 1 GLOBULIN: 0.4 G/DL (ref 0.2–0.6)
ALPHA 2 GLOBULIN: 0.8 G/DL (ref 0.4–1.1)
BETA GLOBULIN: 0.9 G/DL (ref 0.5–1.2)
GAMMA GLOBULIN: 0.7 G/DL (ref 0.5–1.4)
IF BLOCK AB SER QL RIA: NEGATIVE
IMMUNOFIXATION COMMENT: NORMAL
PATH REVIEW - SERUM IMMUNOFIXATION: NORMAL
PATH REVIEW-SERUM PROTEIN ELECTROPHORESIS: NORMAL
PROTEIN ELECTROPHORESIS COMMENT: NORMAL

## 2024-03-07 LAB — PCA IGG SER-ACNC: 2 UNITS (ref 0–24.9)

## 2024-03-11 ENCOUNTER — TELEPHONE (OUTPATIENT)
Dept: HEMATOLOGY/ONCOLOGY | Facility: HOSPITAL | Age: 84
End: 2024-03-11
Payer: MEDICARE

## 2024-03-11 NOTE — TELEPHONE ENCOUNTER
Talked to Nicole and went over the information below from Stacey Arnold NP.      ----- Message from GRETA Kenny sent at 3/10/2024  6:26 PM EDT -----  Regarding: labs  Gregory Diaz, can you please call this patient and let her know that her labs are fairly normal the only thing I would suggest is her take a folic acid and B12 supplement daily.  These levels are borderline low.  We will see her in 6 months.  Thank you Stacey  ----- Message -----  From: Lab, Background User  Sent: 3/4/2024  10:48 AM EDT  To: GRETA Kenny

## 2024-03-12 ENCOUNTER — TELEPHONE (OUTPATIENT)
Dept: HEMATOLOGY/ONCOLOGY | Facility: HOSPITAL | Age: 84
End: 2024-03-12
Payer: MEDICARE

## 2024-03-12 ENCOUNTER — DOCUMENTATION (OUTPATIENT)
Dept: HEMATOLOGY/ONCOLOGY | Facility: HOSPITAL | Age: 84
End: 2024-03-12
Payer: MEDICARE

## 2024-04-01 ENCOUNTER — OFFICE VISIT (OUTPATIENT)
Dept: PRIMARY CARE | Facility: CLINIC | Age: 84
End: 2024-04-01
Payer: MEDICARE

## 2024-04-01 VITALS
SYSTOLIC BLOOD PRESSURE: 176 MMHG | DIASTOLIC BLOOD PRESSURE: 91 MMHG | HEART RATE: 78 BPM | WEIGHT: 148 LBS | HEIGHT: 61 IN | BODY MASS INDEX: 27.94 KG/M2

## 2024-04-01 DIAGNOSIS — G89.29 CHRONIC PAIN OF LEFT KNEE: Primary | ICD-10-CM

## 2024-04-01 DIAGNOSIS — M25.562 CHRONIC PAIN OF LEFT KNEE: Primary | ICD-10-CM

## 2024-04-01 PROBLEM — J18.9 PNEUMONIA: Status: ACTIVE | Noted: 2024-04-01

## 2024-04-01 PROBLEM — R41.89 IMPAIRED COGNITION: Status: ACTIVE | Noted: 2024-04-01

## 2024-04-01 PROBLEM — R05.9 COUGH, UNSPECIFIED: Status: ACTIVE | Noted: 2023-03-21

## 2024-04-01 PROBLEM — N18.30 ANEMIA DUE TO STAGE 3 CHRONIC KIDNEY DISEASE (MULTI): Status: ACTIVE | Noted: 2024-04-01

## 2024-04-01 PROBLEM — D63.1 ANEMIA DUE TO STAGE 3 CHRONIC KIDNEY DISEASE (MULTI): Status: ACTIVE | Noted: 2024-04-01

## 2024-04-01 PROBLEM — J32.9 SINUSITIS: Status: ACTIVE | Noted: 2024-04-01

## 2024-04-01 PROCEDURE — 1159F MED LIST DOCD IN RCRD: CPT | Performed by: INTERNAL MEDICINE

## 2024-04-01 PROCEDURE — 1160F RVW MEDS BY RX/DR IN RCRD: CPT | Performed by: INTERNAL MEDICINE

## 2024-04-01 PROCEDURE — 1036F TOBACCO NON-USER: CPT | Performed by: INTERNAL MEDICINE

## 2024-04-01 PROCEDURE — 99213 OFFICE O/P EST LOW 20 MIN: CPT | Performed by: INTERNAL MEDICINE

## 2024-04-01 NOTE — PROGRESS NOTES
Subjective   Patient ID: Nicole Bobo is a 83 y.o. female who presents for Knee Pain.    HPI     Review of Systems    Objective   There were no vitals taken for this visit.    Physical Exam    Assessment/Plan

## 2024-04-01 NOTE — PROGRESS NOTES
"Subjective   Patient ID: Nicole Bobo is a 83 y.o. female who presents for Knee Pain.    Has been having left knee pain for years, at least 1 year.   No injury  The knee grinds  Never gets big, red and hot  She uses the cane when it hurts more  First getting out of bed is not more or less painful than other times of the day  Getting in the car she has to lift up her leg, has to use her hand to get into the car         Knee Pain          Review of Systems    Objective   BP (!) 176/91   Pulse 78   Ht 1.549 m (5' 1\")   Wt 67.1 kg (148 lb)   BMI 27.96 kg/m²     Physical Exam  Musculoskeletal:      Comments: LEFT KNEE    Pt with hypertrophy of the left knee  Not warm  Negative Elmer, no click  No effusion  Neg drawer  Mild crepitans on extension         Assessment/Plan          Patient Instructions   Left knee pain, likely arthritis, get xray  If bone on bone will have you see Dr. Pernell Guy, [phone] to schedule  If patellofemoral arthritis, would have you do physical therapy   "

## 2024-04-01 NOTE — PATIENT INSTRUCTIONS
Left knee pain, likely arthritis, get xray  If bone on bone will have you see Dr. Pernell Guy, [phone] to schedule  If patellofemoral arthritis, would have you do physical therapy

## 2024-04-02 ENCOUNTER — HOSPITAL ENCOUNTER (OUTPATIENT)
Dept: RADIOLOGY | Facility: HOSPITAL | Age: 84
Discharge: HOME | End: 2024-04-02
Payer: MEDICARE

## 2024-04-02 DIAGNOSIS — G89.29 CHRONIC PAIN OF LEFT KNEE: ICD-10-CM

## 2024-04-02 DIAGNOSIS — M25.562 CHRONIC PAIN OF LEFT KNEE: ICD-10-CM

## 2024-04-02 PROCEDURE — 73562 X-RAY EXAM OF KNEE 3: CPT | Mod: LT

## 2024-04-26 ENCOUNTER — OFFICE VISIT (OUTPATIENT)
Dept: ORTHOPEDIC SURGERY | Facility: CLINIC | Age: 84
End: 2024-04-26
Payer: MEDICARE

## 2024-04-26 DIAGNOSIS — G89.29 CHRONIC PAIN OF LEFT KNEE: Primary | ICD-10-CM

## 2024-04-26 DIAGNOSIS — M25.562 CHRONIC PAIN OF LEFT KNEE: Primary | ICD-10-CM

## 2024-04-26 PROCEDURE — 1036F TOBACCO NON-USER: CPT | Performed by: ORTHOPAEDIC SURGERY

## 2024-04-26 PROCEDURE — 99204 OFFICE O/P NEW MOD 45 MIN: CPT | Performed by: ORTHOPAEDIC SURGERY

## 2024-04-26 PROCEDURE — 1159F MED LIST DOCD IN RCRD: CPT | Performed by: ORTHOPAEDIC SURGERY

## 2024-04-26 PROCEDURE — 1160F RVW MEDS BY RX/DR IN RCRD: CPT | Performed by: ORTHOPAEDIC SURGERY

## 2024-04-26 PROCEDURE — 20610 DRAIN/INJ JOINT/BURSA W/O US: CPT | Performed by: ORTHOPAEDIC SURGERY

## 2024-04-26 RX ORDER — TRIAMCINOLONE ACETONIDE 40 MG/ML
1 INJECTION, SUSPENSION INTRA-ARTICULAR; INTRAMUSCULAR
Status: COMPLETED | OUTPATIENT
Start: 2024-04-26 | End: 2024-04-26

## 2024-04-26 RX ADMIN — TRIAMCINOLONE ACETONIDE 1 ML: 40 INJECTION, SUSPENSION INTRA-ARTICULAR; INTRAMUSCULAR at 09:36

## 2024-04-26 NOTE — PROGRESS NOTES
This is a consultation from Dr. Aliyah Sosa DO for   Chief Complaint   Patient presents with    Left Knee - Follow-up       This is a 83 y.o. female who presents for evaluation of left knee pain.  Patient states she has had knee pain for a couple years, is gradually gotten worse.  Been exacerbated last few weeks, sharp stabbing pain over the lateral aspect of the left knee worse with walking.  She uses a walking stick.  He is having more difficult time getting around.  She is never had injections or surgery.  No numbness or tingling no fevers or chills no shooting pain down the leg    Physical Exam    There has been no interval change in this patient's past medical, surgical, medications, allergies, family history or social history since the most recent visit to a provider within our department. 14 point review of systems was performed, reviewed, and negative except for pertinent positives documented in the history of present illness.     Constitutional: well developed, well nourished female in no acute distress  Psychiatric: normal mood, appropriate affect  Eyes: sclera anicteric  HENT: normocephalic/atraumatic  CV: regular rate and rhythm   Respiratory: non labored breathing  Integumentary: no rash  Neurological: moves all extremities    Left knee exam: skin intact no lacerations or abrations.  1+ effusion.  Tender lateral joint line. negative log roll negative patellar grind. ROM 0-120. stable to varus and valgus stress at 0 and 30 degrees. negative lachman negative posterior drawer negative samantha. 5/5 ehl/fhl/gs/ta. silt s/s/sp/dp/t. 2+ dp/pt        Xrays were ordered by me, they were reviewed and independently interpreted by me today, they show severe degenerative disease bone-on-bone arthritis laterally on the left knee    L Inj/Asp: L knee on 4/26/2024 9:36 AM  Indications: pain and joint swelling  Details: 22 G needle, anterolateral approach  Medications: 1 mL triamcinolone acetonide 40  mg/mL    Discussion:  I discussed the conservative treatment options for knee osteoarthritis including but not limited to physical therapy, oral NSAIDS, activity and lifestyle modification, and corticosteroid injections. Pt has elected to undergo a cortisone injection today. I have explained the risk and benefits of an injection including the possibility of joint infection, bleeding, damage to cartilage, allergic reaction. Patient verbalized understanding and gave verbal consent wishes to proceed with a intra-articular cortisone injection for their knee.    Procedure:  After discussing the risk and benefits of the procedure, we proceeded with an intra-articular left knee injection. We discussed the risks and benefits and potential morbidity related to the treatment, and to the prescription medication administered in the injection    With the patient's informed verbal consent, the left knee was prepped in standard sterile fashion with Chlorhexidine. The skin was then anesthetized with ethyl chloride spray and cleaned again with Chlorhexidine. The knee was then apirated/injected with a prefilled 20-gauge syringe of 40 mg Kenalog + 4 ml Lidocaine using the lateral approach without complications.  The patient tolerated this well and felt immediate initial relief of symptoms. A bandaid was applied and the patient ambulated out of the clinic on ther own accord without difficulty. Patient was instructed to avoid physical activity for 24-48 hours to prevent the knees from swelling and may ice the knees as tolerated. Patient should contact the office if any signs of of infection appear: redness, fever, chills, drainage, swelling or warmth to the knees.  Pt understands that the injections can be repeated no sooner than 3 months.  Procedure, treatment alternatives, risks and benefits explained, specific risks discussed. Consent was given by the patient. Immediately prior to procedure a time out was called to verify the correct  "patient, procedure, equipment, support staff and site/side marked as required. Patient was prepped and draped in the usual sterile fashion.             Impression/Plan: This is a 83 y.o. female with severe left knee arthritis.  I had an in depth discussion with the patient regarding treatment options for arthritis and their relative risks and benefits. We reviewed surgical and nonsurgical option for treatment. Treatments include anti inflammatory medications, physical therapy, weight loss, activity modification, use of assistive devices, injection therapies. We discussed current prescriptions and risks and benefits of continuation of prescription medication as apporpriate. We discussed that arthritis is often progressive over time, an in end stage arthritis surgical interventions can be considered, including arthroplasty. All questions were answered and the patient voiced their understanding.  Will start with nonsurgical management I will see her back.    BMI Readings from Last 1 Encounters:   04/01/24 27.96 kg/m²      Lab Results   Component Value Date    CREATININE 0.70 03/04/2024     Tobacco Use: Low Risk  (4/1/2024)    Patient History     Smoking Tobacco Use: Never     Smokeless Tobacco Use: Never     Passive Exposure: Not on file      Computed MELD 3.0 unavailable. One or more values for this score either were not found within the given timeframe or did not fit some other criterion.  Computed MELD-Na unavailable. One or more values for this score either were not found within the given timeframe or did not fit some other criterion.       Lab Results   Component Value Date    HGBA1C 5.5 07/02/2019     No results found for: \"STAPHMRSASCR\"  "

## 2024-04-26 NOTE — LETTER
April 26, 2024     Aliyah Sosa DO  [address]  [place] [address]  [facility] [address]    Patient: Nicole Bobo   YOB: 1940   Date of Visit: 4/26/2024       Dear Dr. Aliyah Sosa DO:    Thank you for referring Nicole Bobo to me for evaluation. Below are my notes for this consultation.  If you have questions, please do not hesitate to call me. I look forward to following your patient along with you.       Sincerely,     Himanshu Guy MD      CC: No Recipients  ______________________________________________________________________________________    This is a consultation from Dr. Aliyah Sosa DO for   Chief Complaint   Patient presents with   • Left Knee - Follow-up       This is a 83 y.o. female who presents for evaluation of left knee pain.  Patient states she has had knee pain for a couple years, is gradually gotten worse.  Been exacerbated last few weeks, sharp stabbing pain over the lateral aspect of the left knee worse with walking.  She uses a walking stick.  He is having more difficult time getting around.  She is never had injections or surgery.  No numbness or tingling no fevers or chills no shooting pain down the leg    Physical Exam    There has been no interval change in this patient's past medical, surgical, medications, allergies, family history or social history since the most recent visit to a provider within our department. 14 point review of systems was performed, reviewed, and negative except for pertinent positives documented in the history of present illness.     Constitutional: well developed, well nourished female in no acute distress  Psychiatric: normal mood, appropriate affect  Eyes: sclera anicteric  HENT: normocephalic/atraumatic  CV: regular rate and rhythm   Respiratory: non labored breathing  Integumentary: no rash  Neurological: moves all extremities    Left knee exam: skin intact no lacerations or abrations.  1+ effusion.  Tender lateral joint line.  negative log roll negative patellar grind. ROM 0-120. stable to varus and valgus stress at 0 and 30 degrees. negative lachman negative posterior drawer negative samantha. 5/5 ehl/fhl/gs/ta. silt s/s/sp/dp/t. 2+ dp/pt        Xrays were ordered by me, they were reviewed and independently interpreted by me today, they show severe degenerative disease bone-on-bone arthritis laterally on the left knee    L Inj/Asp: L knee on 4/26/2024 9:36 AM  Indications: pain and joint swelling  Details: 22 G needle, anterolateral approach  Medications: 1 mL triamcinolone acetonide 40 mg/mL    Discussion:  I discussed the conservative treatment options for knee osteoarthritis including but not limited to physical therapy, oral NSAIDS, activity and lifestyle modification, and corticosteroid injections. Pt has elected to undergo a cortisone injection today. I have explained the risk and benefits of an injection including the possibility of joint infection, bleeding, damage to cartilage, allergic reaction. Patient verbalized understanding and gave verbal consent wishes to proceed with a intra-articular cortisone injection for their knee.    Procedure:  After discussing the risk and benefits of the procedure, we proceeded with an intra-articular left knee injection. We discussed the risks and benefits and potential morbidity related to the treatment, and to the prescription medication administered in the injection    With the patient's informed verbal consent, the left knee was prepped in standard sterile fashion with Chlorhexidine. The skin was then anesthetized with ethyl chloride spray and cleaned again with Chlorhexidine. The knee was then apirated/injected with a prefilled 20-gauge syringe of 40 mg Kenalog + 4 ml Lidocaine using the lateral approach without complications.  The patient tolerated this well and felt immediate initial relief of symptoms. A bandaid was applied and the patient ambulated out of the clinic on ther own accord  without difficulty. Patient was instructed to avoid physical activity for 24-48 hours to prevent the knees from swelling and may ice the knees as tolerated. Patient should contact the office if any signs of of infection appear: redness, fever, chills, drainage, swelling or warmth to the knees.  Pt understands that the injections can be repeated no sooner than 3 months.  Procedure, treatment alternatives, risks and benefits explained, specific risks discussed. Consent was given by the patient. Immediately prior to procedure a time out was called to verify the correct patient, procedure, equipment, support staff and site/side marked as required. Patient was prepped and draped in the usual sterile fashion.             Impression/Plan: This is a 83 y.o. female with severe left knee arthritis.  I had an in depth discussion with the patient regarding treatment options for arthritis and their relative risks and benefits. We reviewed surgical and nonsurgical option for treatment. Treatments include anti inflammatory medications, physical therapy, weight loss, activity modification, use of assistive devices, injection therapies. We discussed current prescriptions and risks and benefits of continuation of prescription medication as apporpriate. We discussed that arthritis is often progressive over time, an in end stage arthritis surgical interventions can be considered, including arthroplasty. All questions were answered and the patient voiced their understanding.  Will start with nonsurgical management I will see her back.    BMI Readings from Last 1 Encounters:   04/01/24 27.96 kg/m²      Lab Results   Component Value Date    CREATININE 0.70 03/04/2024     Tobacco Use: Low Risk  (4/1/2024)    Patient History    • Smoking Tobacco Use: Never    • Smokeless Tobacco Use: Never    • Passive Exposure: Not on file      Computed MELD 3.0 unavailable. One or more values for this score either were not found within the given timeframe  "or did not fit some other criterion.  Computed MELD-Na unavailable. One or more values for this score either were not found within the given timeframe or did not fit some other criterion.       Lab Results   Component Value Date    HGBA1C 5.5 07/02/2019     No results found for: \"STAPHMRSASCR\"  "

## 2024-06-04 ENCOUNTER — APPOINTMENT (OUTPATIENT)
Dept: HEMATOLOGY/ONCOLOGY | Facility: HOSPITAL | Age: 84
End: 2024-06-04
Payer: MEDICARE

## 2024-08-09 ENCOUNTER — LAB (OUTPATIENT)
Dept: LAB | Facility: LAB | Age: 84
End: 2024-08-09
Payer: MEDICARE

## 2024-08-09 DIAGNOSIS — E78.5 HYPERLIPIDEMIA, UNSPECIFIED HYPERLIPIDEMIA TYPE: ICD-10-CM

## 2024-08-15 ENCOUNTER — APPOINTMENT (OUTPATIENT)
Dept: PRIMARY CARE | Facility: CLINIC | Age: 84
End: 2024-08-15
Payer: MEDICARE

## 2024-08-30 ENCOUNTER — LAB (OUTPATIENT)
Dept: LAB | Facility: LAB | Age: 84
End: 2024-08-30
Payer: MEDICARE

## 2024-08-30 DIAGNOSIS — E78.5 HYPERLIPIDEMIA, UNSPECIFIED HYPERLIPIDEMIA TYPE: ICD-10-CM

## 2024-08-30 LAB
ALBUMIN SERPL BCP-MCNC: 3.5 G/DL (ref 3.4–5)
ALP SERPL-CCNC: 95 U/L (ref 33–136)
ALT SERPL W P-5'-P-CCNC: 12 U/L (ref 7–45)
ANION GAP SERPL CALC-SCNC: 10 MMOL/L (ref 10–20)
AST SERPL W P-5'-P-CCNC: 19 U/L (ref 9–39)
BASOPHILS # BLD AUTO: 0.08 X10*3/UL (ref 0–0.1)
BASOPHILS NFR BLD AUTO: 1.2 %
BILIRUB SERPL-MCNC: 0.6 MG/DL (ref 0–1.2)
BUN SERPL-MCNC: 19 MG/DL (ref 6–23)
CALCIUM SERPL-MCNC: 9.7 MG/DL (ref 8.6–10.3)
CHLORIDE SERPL-SCNC: 106 MMOL/L (ref 98–107)
CHOLEST SERPL-MCNC: 180 MG/DL (ref 0–199)
CHOLESTEROL/HDL RATIO: 3.1
CO2 SERPL-SCNC: 29 MMOL/L (ref 21–32)
CREAT SERPL-MCNC: 0.75 MG/DL (ref 0.5–1.05)
EGFRCR SERPLBLD CKD-EPI 2021: 79 ML/MIN/1.73M*2
EOSINOPHIL # BLD AUTO: 0.44 X10*3/UL (ref 0–0.4)
EOSINOPHIL NFR BLD AUTO: 6.3 %
ERYTHROCYTE [DISTWIDTH] IN BLOOD BY AUTOMATED COUNT: 13.7 % (ref 11.5–14.5)
GLUCOSE SERPL-MCNC: 81 MG/DL (ref 74–99)
HCT VFR BLD AUTO: 46 % (ref 36–46)
HDLC SERPL-MCNC: 58.4 MG/DL
HGB BLD-MCNC: 14.4 G/DL (ref 12–16)
IMM GRANULOCYTES # BLD AUTO: 0.01 X10*3/UL (ref 0–0.5)
IMM GRANULOCYTES NFR BLD AUTO: 0.1 % (ref 0–0.9)
LDLC SERPL CALC-MCNC: 108 MG/DL
LYMPHOCYTES # BLD AUTO: 1.2 X10*3/UL (ref 0.8–3)
LYMPHOCYTES NFR BLD AUTO: 17.3 %
MCH RBC QN AUTO: 27.1 PG (ref 26–34)
MCHC RBC AUTO-ENTMCNC: 31.3 G/DL (ref 32–36)
MCV RBC AUTO: 87 FL (ref 80–100)
MONOCYTES # BLD AUTO: 0.52 X10*3/UL (ref 0.05–0.8)
MONOCYTES NFR BLD AUTO: 7.5 %
NEUTROPHILS # BLD AUTO: 4.69 X10*3/UL (ref 1.6–5.5)
NEUTROPHILS NFR BLD AUTO: 67.6 %
NON HDL CHOLESTEROL: 122 MG/DL (ref 0–149)
NRBC BLD-RTO: 0 /100 WBCS (ref 0–0)
PLATELET # BLD AUTO: 365 X10*3/UL (ref 150–450)
POTASSIUM SERPL-SCNC: 3.9 MMOL/L (ref 3.5–5.3)
PROT SERPL-MCNC: 6.8 G/DL (ref 6.4–8.2)
RBC # BLD AUTO: 5.32 X10*6/UL (ref 4–5.2)
SODIUM SERPL-SCNC: 141 MMOL/L (ref 136–145)
TRIGL SERPL-MCNC: 68 MG/DL (ref 0–149)
TSH SERPL-ACNC: 2.65 MIU/L (ref 0.44–3.98)
VLDL: 14 MG/DL (ref 0–40)
WBC # BLD AUTO: 6.9 X10*3/UL (ref 4.4–11.3)

## 2024-08-30 PROCEDURE — 36415 COLL VENOUS BLD VENIPUNCTURE: CPT

## 2024-09-03 ENCOUNTER — OFFICE VISIT (OUTPATIENT)
Dept: HEMATOLOGY/ONCOLOGY | Facility: HOSPITAL | Age: 84
End: 2024-09-03
Payer: MEDICARE

## 2024-09-03 VITALS
TEMPERATURE: 97.2 F | WEIGHT: 150.46 LBS | OXYGEN SATURATION: 95 % | HEART RATE: 72 BPM | BODY MASS INDEX: 28.43 KG/M2 | SYSTOLIC BLOOD PRESSURE: 150 MMHG | DIASTOLIC BLOOD PRESSURE: 87 MMHG | RESPIRATION RATE: 18 BRPM

## 2024-09-03 DIAGNOSIS — D50.9 IRON DEFICIENCY ANEMIA, UNSPECIFIED IRON DEFICIENCY ANEMIA TYPE: ICD-10-CM

## 2024-09-03 PROCEDURE — 1125F AMNT PAIN NOTED PAIN PRSNT: CPT

## 2024-09-03 PROCEDURE — 99214 OFFICE O/P EST MOD 30 MIN: CPT

## 2024-09-03 PROCEDURE — 1159F MED LIST DOCD IN RCRD: CPT

## 2024-09-03 ASSESSMENT — ENCOUNTER SYMPTOMS
DEPRESSION: 0
LOSS OF SENSATION IN FEET: 0
OCCASIONAL FEELINGS OF UNSTEADINESS: 0

## 2024-09-03 ASSESSMENT — PAIN SCALES - GENERAL: PAINLEVEL: 4

## 2024-09-03 ASSESSMENT — LIFESTYLE VARIABLES
HOW OFTEN DO YOU HAVE SIX OR MORE DRINKS ON ONE OCCASION: NEVER
HOW MANY STANDARD DRINKS CONTAINING ALCOHOL DO YOU HAVE ON A TYPICAL DAY: PATIENT DOES NOT DRINK
HOW OFTEN DO YOU HAVE A DRINK CONTAINING ALCOHOL: NEVER
SKIP TO QUESTIONS 9-10: 1
AUDIT-C TOTAL SCORE: 0

## 2024-09-03 ASSESSMENT — PATIENT HEALTH QUESTIONNAIRE - PHQ9
SUM OF ALL RESPONSES TO PHQ9 QUESTIONS 1 & 2: 0
2. FEELING DOWN, DEPRESSED OR HOPELESS: NOT AT ALL
1. LITTLE INTEREST OR PLEASURE IN DOING THINGS: NOT AT ALL

## 2024-09-03 NOTE — PROGRESS NOTES
Patient Visit Information:   Visit Type: Benign Heme Follow-up Iron Deficiency Anemia     Treatment Synopsis:   83-year-old woman with no sig PMH referred for evaluation and management of recurrent microcytic anemia c/w DINH. Patient first noted have microcytic anemia in 7/2020 with H/H 8.1/29.4 MCV 65 with ferritin <8. Patient had EGD/colonoscopy c/w mild gastritis was placed on PPI for 2 months.  She was also placed on oral iron but stopped taking it due to increasing constipation problems (of note, patient has chronic constipation takes Miralax Daily). Patient states that she only took it for 1 week before stopping. In March 2021, repeat CBC showed continued anemia but improved iron studies c/w H/H 10.8/38, MCV 78, ferritin 60, iron sat% 33%.     In may 2021, patient developed SOB/BURROUGHS . CBC on 5/14/2021 showed hemoglobin was 6.6/22.9, MCV 67 with ferritin of 11, and iron sat uncalculatable. Of note, accompanying her anemia is thrombocytosis (527-->693-->521) which likely represents a reactive process. She has had normal WBC's. She was sent to ED and transfused 2 units PRBC. Patient states that prior to ED visit she was in her usual state of health.      Notation from 6/15/2021: Fluid within endometrial cavity of the uterus. Given the postmenopausal status of the patient recommend correlation with pelvic ultrasound to assess for underlying endometrial lesions. Right middle lobe pulmonary nodule demonstrating stability since 2015 benign. Moderate sliding hiatal hernia. Low-density lesion within segment 7 has demonstrated stability since 2015 favoring hepatic cyst.      January 2024 IV iron administered. Today she is here to reassess iron infusion intervention.      History of Present Illness:      ID Statement:   CHELSEY FLETCHER is a 83 year old Female     Chief Complaint: Microcytic anemia, DINH    Interval History:   Patient presents with  today for follow-up regarding microcytic and iron deficiency  anemia.  Patient states overall she feels very well.  She denies any feeling of fatigue any different from before iron.      She denies any fever, recent illness, bleeding or bruising, cold hands or feet, no pica, no abdominal pain, no melena, no bleeding from any location, no bowel or bladder concerns, no cough, no shortness of breath or chest pain. She has had no nosebleeds or hematemesis.  She has a history of arthritis and does have discomfort in her lower back with excessive movement.  But she continues to do her ADLs throughout the day without difficulty.  She has had left knee issues and swelling and is seeing orthopedic.   Some constipation. Drink more fluids and fiber.   Additionally, she has eczema on her arms and chest. Improved from last visit.   Knee pain, followed ortho, who administered steroid injection April 2024.     Colonoscopy completed 7/2020 that did not note any bleeding though did note a hiatal hernia in which she received 6 months of Protonix.  She has had no follow-up since.  May 2022 she had 3 doses of IV Venofer. Feraheme 10/11/2023 and 10/18/2023. Feraheme X3 does in January 2024.      Review of Systems:   12 pt ROS was performed, pertinent positive and negative findings as per HPI above, remainder of systems reviewed and are negative.    PMH/PSH:   -DINH  -Microcytic Anemia  -Chronic constipation  -Eczema  -Macular degeneration  -Tonsillectomy  -Appendectomy   -Arthritis and degenerative changes     FH:   -Denies any FH of malignancy, bleeding or clotting disorders that she is aware    Social Hx:   Smoked x 1 year, no alcohol, no drugs, worked at [employer]. . Lives in [place]. 3 adult children.     Allergies and Intolerances:   Allergies   Allergen Reactions    Animal Dander Unknown    Dog Dander Other      Outpatient Medication Profile:  Current Outpatient Medications   Medication Instructions    desoximetasone (Topicort) 0.25 % cream APPLY ONE APPLICATION TOPICALLY TO  AFFECTED AREA DAILY    multivit-iron-minerals-folic acid (Centrum Silver) 0.4 mg-300 mcg- 250 mcg tab oral    triamcinolone (Kenalog) 0.1 % cream APPLY TO AFFECTED AREA(S) TWICE A DAY FOR 14 DAYS  THEN NEED BREAK FOR 5 DAYS.    vit A/vit C/vit E/zinc/copper (PRESERVISION AREDS ORAL) 1 tablet, oral, 2 times daily      Past Medical History:   Diagnosis Date    Anesthesia of skin 08/08/2017    Arm numbness    Other constipation 09/23/2021    Chronic constipation    Pain in left hip 06/08/2016    Hip pain, acute, left    Personal history of other diseases of the respiratory system 11/04/2014    History of sinusitis    Personal history of pneumonia (recurrent) 11/04/2014    History of pneumonia    Unspecified cataract 02/13/2019    Cataracts, bilateral      Past Surgical History:   Procedure Laterality Date    APPENDECTOMY  01/20/2014    Appendectomy    TONSILLECTOMY  01/20/2014    Tonsillectomy      Physical Exam:      Constitutional: Well developed, awake/alert/oriented  x3, no distress, alert and cooperative   Eyes: PERRL, EOMI, clear sclera   Respiratory/Thorax: Patent airways, CTAB, normal  breath sounds with good chest expansion, thorax symmetric   Cardiovascular: Regular, rate and rhythm, no murmurs,  2+ equal pulses of the extremities, normal S 1and S 2   Gastrointestinal: Nondistended, soft, non-tender,  no rebound tenderness or guarding, no masses palpable, no organomegaly, +BS, no bruits   Extremities: normal extremities, no cyanosis edema,  contusions or wounds, no clubbing, uses a cane   Neurological: alert and oriented x3, intact senses,  motor, response and reflexes, normal strength   Psychological: Appropriate mood and behavior   Skin: Warm and dry, no lesions, no rashes, pink          1/12/2024    10:59 AM 1/19/2024     9:22 AM 1/19/2024    10:05 AM 1/19/2024    10:35 AM 3/4/2024     9:50 AM 4/1/2024     2:34 PM 9/3/2024     8:30 AM   Vitals   Systolic 129 162 143 132 119 176 150   Diastolic 81 66 77 69  "74 91 87   Heart Rate 75 76 72 73 79 78 72   Temp 36.6 °C (97.9 °F) 36.2 °C (97.2 °F) 36.3 °C (97.3 °F) 36.2 °C (97.2 °F) 37 °C (98.6 °F)  36.2 °C (97.2 °F)   Resp 18 16 18 18 16  18   Height (in)     1.549 m (5' 1\") 1.549 m (5' 1\")    Weight (lb)  149.91   148.48 148 150.46   BMI  28.33 kg/m2   28.06 kg/m2 27.96 kg/m2 28.43 kg/m2   BSA (m2)  1.71 m2   1.7 m2 1.7 m2 1.71 m2   Visit Report     Report Report Report      Lab Results:  Lab Results   Component Value Date    WBC 6.9 08/30/2024    NEUTROABS 4.69 08/30/2024    IGABSOL 0.01 08/30/2024    LYMPHSABS 1.20 08/30/2024    MONOSABS 0.52 08/30/2024    EOSABS 0.44 (H) 08/30/2024    BASOSABS 0.08 08/30/2024    RBC 5.32 (H) 08/30/2024    MCV 87 08/30/2024    MCHC 31.3 (L) 08/30/2024    HGB 14.4 08/30/2024    HCT 46.0 08/30/2024     08/30/2024     Lab Results   Component Value Date    RETICCTPCT 1.4 03/04/2024      Lab Results   Component Value Date    CREATININE 0.75 08/30/2024    BUN 19 08/30/2024    EGFR 79 08/30/2024     08/30/2024    K 3.9 08/30/2024     08/30/2024    CO2 29 08/30/2024      Lab Results   Component Value Date    ALT 12 08/30/2024    AST 19 08/30/2024    ALKPHOS 95 08/30/2024    BILITOT 0.6 08/30/2024      Lab Results   Component Value Date    TSH 2.65 08/30/2024     Lab Results   Component Value Date    TSH 2.65 08/30/2024     Lab Results   Component Value Date    IRON 81 03/04/2024    TIBC 308 03/04/2024    FERRITIN 114 03/04/2024      Lab Results   Component Value Date    VLPLWTUW76 330 03/04/2024      Lab Results   Component Value Date    FOLATE 15.3 03/04/2024     Lab Results   Component Value Date    STACI NEGATIVE 05/25/2021    RF <10 05/25/2021    SEDRATE 8 03/04/2024     Lab Results   Component Value Date     03/04/2024     Lab Results   Component Value Date    HAPTOGLOBIN 205 03/04/2024     Lab Results   Component Value Date    SPEP Normal. 03/04/2024     Radiology Result:  Impression:  1. Fluid within endometrial " cavity of the uterus. Given the  postmenopausal status of the patient recommend correlation with  pelvic ultrasound to assess for underlying endometrial lesions.  2. Right middle lobe pulmonary nodule demonstrating stability since  2015 benign  3. Fatty atrophy of the bilateral gluteus medius musculature  4. Moderate sliding hiatal hernia  5. Low-density lesion withinsegment 7 has demonstrated stability  since 2015 favoring hepatic cyst.  CT Abdomen and Pelvis with IV Contrast [Jun 16 2021 11:14PM]  IMPRESSION:  Moderate to severe left knee osteoarthritis worse in the lateral  compartment. Moderate-sized effusion  XR knee left 3 views 4/3/2024    Assessment and Plan:   Patient is an 83-year-old woman with no significant PMH referred for follow-up and management of recurrent microcytic anemia c/w DINH likely from GI blood loss or malabsorption due to hiatal hernia. Previous visit 1/2024 her hemoglobin is 11.6 iron Tsat 6 and ferritin 20, improved from prior.  Patient elected for IV iron due to occasional constipation unless on MiraLAX.  IV Feraheme ordered X3 doses January 2024.      Previous imaging show moderate sliding hiatal hernia; asymptomatic, PPI finished after 6 months of prescription.  No follow-up planned with GI.  Recommended follow-up if DINH persists.      Today, 3/4/2024 labs pending.  Fluid within endometrial cavity of the uterus. Endometrial thickening but no PMB.   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      7/2020: 5 cm hiatal hernia, EGD with  LA Grade B reflux esophagitis and gastritis biopsied and was negative and Colonoscopy unremarkable.     6/15/2021 Mammogram no evidence of malignancy. New order not scheduled as of today.      9/3/2024 Patient doing well. No loss of energy. Labs are stable. Patient will return in 4 months and call if need seen sooner. Will reassess MGUS labs. In March 2024 no M-protein.     Future IV iron to  be given with with premeds benadryl and Hydrocortisone (h/o Eczema and notice worsening pruritis after each Venofer infusion in past).     Follow-Up:     RTC   -4 months with labs    Medications:  -NA    Referral:  -PCP as needed  -Follow up with Derm as needed  -GI for malabsorption as needed  -Ortho for knee arthritis    Instruction Summary:  Reviewed and discussed lab, imaging, and pathology results with patient in detail as well as diagnosis, prognosis, and treatment options. Educated on malabsorption as we age and iron rich foods education. Recommend GI workup and continued follow up with GI if iron deficiency persists.  Discussed follow up with derm if needed for eczema. And she is not interested at this time. Improved eczema.   Patient verbalized understanding, and all her questions were answered to her satisfaction.      Thank you for allowing me to participate in your care.    Sincerely,  FLORA Juarez-CNP         Written by voice recognition software. Please ask for clarification as needed.

## 2024-09-10 ENCOUNTER — APPOINTMENT (OUTPATIENT)
Dept: PRIMARY CARE | Facility: CLINIC | Age: 84
End: 2024-09-10
Payer: MEDICARE

## 2024-09-10 VITALS
HEART RATE: 72 BPM | SYSTOLIC BLOOD PRESSURE: 155 MMHG | WEIGHT: 149 LBS | HEIGHT: 61 IN | BODY MASS INDEX: 28.13 KG/M2 | OXYGEN SATURATION: 96 % | DIASTOLIC BLOOD PRESSURE: 82 MMHG

## 2024-09-10 DIAGNOSIS — Z12.31 VISIT FOR SCREENING MAMMOGRAM: ICD-10-CM

## 2024-09-10 DIAGNOSIS — M25.512 CHRONIC LEFT SHOULDER PAIN: ICD-10-CM

## 2024-09-10 DIAGNOSIS — E61.1 IRON DEFICIENCY: Chronic | ICD-10-CM

## 2024-09-10 DIAGNOSIS — Z00.00 ROUTINE GENERAL MEDICAL EXAMINATION AT HEALTH CARE FACILITY: Primary | ICD-10-CM

## 2024-09-10 DIAGNOSIS — G31.84 MILD COGNITIVE IMPAIRMENT: ICD-10-CM

## 2024-09-10 DIAGNOSIS — G89.29 CHRONIC LEFT SHOULDER PAIN: ICD-10-CM

## 2024-09-10 DIAGNOSIS — E78.5 HYPERLIPIDEMIA, UNSPECIFIED HYPERLIPIDEMIA TYPE: ICD-10-CM

## 2024-09-10 DIAGNOSIS — M81.0 POSTMENOPAUSAL BONE LOSS: ICD-10-CM

## 2024-09-10 PROBLEM — D63.1 ANEMIA DUE TO STAGE 3 CHRONIC KIDNEY DISEASE (MULTI): Status: RESOLVED | Noted: 2024-04-01 | Resolved: 2024-09-10

## 2024-09-10 PROBLEM — N18.30 ANEMIA DUE TO STAGE 3 CHRONIC KIDNEY DISEASE (MULTI): Status: RESOLVED | Noted: 2024-04-01 | Resolved: 2024-09-10

## 2024-09-10 PROCEDURE — 1170F FXNL STATUS ASSESSED: CPT | Performed by: INTERNAL MEDICINE

## 2024-09-10 PROCEDURE — 1159F MED LIST DOCD IN RCRD: CPT | Performed by: INTERNAL MEDICINE

## 2024-09-10 PROCEDURE — 1036F TOBACCO NON-USER: CPT | Performed by: INTERNAL MEDICINE

## 2024-09-10 PROCEDURE — G0439 PPPS, SUBSEQ VISIT: HCPCS | Performed by: INTERNAL MEDICINE

## 2024-09-10 PROCEDURE — 1160F RVW MEDS BY RX/DR IN RCRD: CPT | Performed by: INTERNAL MEDICINE

## 2024-09-10 PROCEDURE — 99214 OFFICE O/P EST MOD 30 MIN: CPT | Performed by: INTERNAL MEDICINE

## 2024-09-10 ASSESSMENT — ACTIVITIES OF DAILY LIVING (ADL)
DOING_HOUSEWORK: INDEPENDENT
BATHING: INDEPENDENT
TAKING_MEDICATION: INDEPENDENT
MANAGING_FINANCES: INDEPENDENT
GROCERY_SHOPPING: INDEPENDENT
DRESSING: INDEPENDENT

## 2024-09-10 ASSESSMENT — PATIENT HEALTH QUESTIONNAIRE - PHQ9
SUM OF ALL RESPONSES TO PHQ9 QUESTIONS 1 AND 2: 0
2. FEELING DOWN, DEPRESSED OR HOPELESS: NOT AT ALL
1. LITTLE INTEREST OR PLEASURE IN DOING THINGS: NOT AT ALL

## 2024-09-10 NOTE — PATIENT INSTRUCTIONS
Memory loss but did well on the memory test, but takes a while for you to answer.  I recommend that you get more exercise and socialize more    Left shoulder pain, xray in 2017 showed arthritis, follow up with Dr. Briones, call [phone] to schedule     Left knee arthritis, go back to see Dr. Guy for another injection, helped in April    Iron deficiciency, blood counts normal on last labs    Vaccines you need  Prevnar 20 to prevent pneumonia  RSV shot, 1 lifetime  Shingrix shots, 2 shot series to prevent shingles  Tdap, tetanus and whooping cough, every 10 years    Call [phone] to schedule mammogram and bone density

## 2024-09-10 NOTE — PROGRESS NOTES
Subjective   Patient ID: Nicole Bobo is a 83 y.o. female who presents for Medicare Annual Wellness Visit Subsequent.    HPI     Review of Systems    Objective   Wt 67.6 kg (149 lb)   BMI 28.15 kg/m²     Physical Exam    Assessment/Plan

## 2024-09-10 NOTE — PROGRESS NOTES
"Subjective   Reason for Visit: Nicole Bobo is an 83 y.o. female here for a Medicare Wellness visit.     Past Medical, Surgical, and Family History reviewed and updated in chart.    Reviewed all medications by prescribing practitioner or clinical pharmacist (such as prescriptions, OTCs, herbal therapies and supplements) and documented in the medical record.    She is having memory issues   This summer it seemed to get worse.   She has been prevagen and not helping  She does get left leg, she got a shot from Dr. Guy and took away the pain. He did not want to do surgery    No cp or pressure  No sob  Bowels are regular  Has not had iron infusions for awhile, sees heme  Her left shoulder bothers her  She does get dizzy, on standing but not with rolling over.           Patient Care Team:  Aliyah Sosa DO as PCP - General  Aliyah Sosa DO as PCP - [employer] PCP     Review of Systems    Objective   Vitals:  /82   Pulse 72   Ht 1.549 m (5' 1\")   Wt 67.6 kg (149 lb)   SpO2 96%   BMI 28.15 kg/m²       Physical Exam  Constitutional:       Appearance: Normal appearance.   Neck:      Vascular: No carotid bruit.   Cardiovascular:      Rate and Rhythm: Normal rate and regular rhythm.      Heart sounds: No murmur heard.  Pulmonary:      Effort: Pulmonary effort is normal.      Breath sounds: Normal breath sounds.   Abdominal:      Tenderness: There is no abdominal tenderness.   Musculoskeletal:      Right lower leg: No edema.      Left lower leg: No edema.      Comments: Left shoulder abduction to 90 degrees with hard stop  Crepitans in the right shoulder but abduction to 130 degrees   Skin:     Coloration: Skin is not jaundiced or pale.   Neurological:      Mental Status: She is alert.      Comments: MMSE 28/30 - missed town and county but is from the next county, but answers very slow  No cogwheel rigidity  No tremor  Face seems masked  Gait is stooped   Psychiatric:         Mood and " Affect: Mood normal.         Assessment & Plan  Routine general medical examination at health care facility                   Patient Instructions   Memory loss but did well on the memory test, but takes a while for you to answer.  I recommend that you get more exercise and socialize more    Left shoulder pain, xray in 2017 showed arthritis, follow up with Dr. Briones, call [phone] to schedule     Left knee arthritis, go back to see Dr. Guy for another injection, helped in April    Iron deficiciency, blood counts normal on last labs    Vaccines you need  Prevnar 20 to prevent pneumonia  RSV shot, 1 lifetime  Shingrix shots, 2 shot series to prevent shingles  Tdap, tetanus and whooping cough, every 10 years    Call [phone] to schedule mammogram and bone density

## 2024-10-23 RX ORDER — HEPARIN 100 UNIT/ML
500 SYRINGE INTRAVENOUS AS NEEDED
OUTPATIENT
Start: 2024-10-23

## 2024-10-23 RX ORDER — HEPARIN SODIUM,PORCINE/PF 10 UNIT/ML
50 SYRINGE (ML) INTRAVENOUS AS NEEDED
OUTPATIENT
Start: 2024-10-23

## 2024-10-23 RX ORDER — HEPARIN SODIUM 1000 [USP'U]/ML
2000 INJECTION, SOLUTION INTRAVENOUS; SUBCUTANEOUS AS NEEDED
OUTPATIENT
Start: 2024-10-23

## 2024-11-26 ENCOUNTER — TELEPHONE (OUTPATIENT)
Dept: HEMATOLOGY/ONCOLOGY | Facility: HOSPITAL | Age: 84
End: 2024-11-26
Payer: MEDICARE

## 2024-11-26 NOTE — TELEPHONE ENCOUNTER
Patient is currently scheduled to see GRETA Juarez on Tues, 1/7/25. Provider will be leaving [facility] and going to [facility] full time. Attempted to reach patient to transfer care to Dr. Pedraza. Left detailed message, with call back number, for patient to call back at earliest convenience.

## 2024-11-29 NOTE — TELEPHONE ENCOUNTER
Spoke to patients  he states he doesn't have his calendar right now and there is time to change it he will call another day.

## 2024-12-06 NOTE — TELEPHONE ENCOUNTER
Patient is currently scheduled to see GRETA Juarez on Tues, 1/7/25. Provider will be leaving [facility] and going to [facility] full time. Reached out to patient x3 to transfer care to Dr. Pedraza. Patient states she does not want to reschedule at this time and will call back later. I informed patient it is important to call back as soon as possible to reschedule due to provider availability. Patient requested call back number. Provided patient with call back number and informed her I will reach out to her x4 if she doesn't call back. Patient verbalized understanding and agreement regarding discussed information via verbal feedback.

## 2024-12-13 ENCOUNTER — TELEPHONE (OUTPATIENT)
Dept: HEMATOLOGY/ONCOLOGY | Facility: HOSPITAL | Age: 84
End: 2024-12-13
Payer: MEDICARE

## 2024-12-13 NOTE — TELEPHONE ENCOUNTER
Patient is currently scheduled to see GRETA Juarez on Tues, 1/7/25. Provider will be leaving [facility] and going to [facility] full time. Attempted to reach patient x4 to transfer care to Dr. Pedraza. Left detailed message, with call back number, for patient to call back at earliest convenience.     4 attempts made to reach patient. Attempt to reach letter sent via mail. Secure chat sent to nurse partners to notify PCP and/or referring provider of failure to follow up. Appointment cancelled.

## 2024-12-13 NOTE — TELEPHONE ENCOUNTER
In-basket message out to patient's PCP on file, Aliyah Sosa DO.     Good afternoon,    A mutual patient was being seen at [facility] (: 1940). This patient was being seen by Stacey Arnold CNP for: IRON DEFICIENCY ANEMIA    The provider the patient was seeing is leaving the [facility]. We have been unable to contact the patient to schedule with an alternate provider. Attempts have been made at least three times to contact the patient, a letter has been sent to their address.    This is a courtesy letter to let you know this patient has no follow up visits scheduled with [facility]. Our office will no longer attempt to reach out to this patient.     Please call [phone] with questions.

## 2025-01-07 ENCOUNTER — APPOINTMENT (OUTPATIENT)
Dept: HEMATOLOGY/ONCOLOGY | Facility: HOSPITAL | Age: 85
End: 2025-01-07
Payer: MEDICARE

## 2025-01-24 ENCOUNTER — TELEPHONE (OUTPATIENT)
Dept: PRIMARY CARE | Facility: CLINIC | Age: 85
End: 2025-01-24
Payer: MEDICARE

## 2025-02-24 ENCOUNTER — HOSPITAL ENCOUNTER (INPATIENT)
Facility: HOSPITAL | Age: 85
LOS: 4 days | Discharge: HOME | End: 2025-03-01
Attending: EMERGENCY MEDICINE | Admitting: INTERNAL MEDICINE
Payer: MEDICARE

## 2025-02-24 ENCOUNTER — APPOINTMENT (OUTPATIENT)
Dept: RADIOLOGY | Facility: HOSPITAL | Age: 85
End: 2025-02-24
Payer: MEDICARE

## 2025-02-24 ENCOUNTER — APPOINTMENT (OUTPATIENT)
Dept: CARDIOLOGY | Facility: HOSPITAL | Age: 85
End: 2025-02-24
Payer: MEDICARE

## 2025-02-24 DIAGNOSIS — R63.4 UNEXPLAINED WEIGHT LOSS: ICD-10-CM

## 2025-02-24 DIAGNOSIS — J81.0 ACUTE PULMONARY EDEMA: ICD-10-CM

## 2025-02-24 DIAGNOSIS — R22.0 FACIAL SWELLING: ICD-10-CM

## 2025-02-24 DIAGNOSIS — G47.34 NOCTURNAL HYPOXIA: ICD-10-CM

## 2025-02-24 DIAGNOSIS — E61.1 IRON DEFICIENCY: Chronic | ICD-10-CM

## 2025-02-24 DIAGNOSIS — J90 BILATERAL PLEURAL EFFUSION: ICD-10-CM

## 2025-02-24 DIAGNOSIS — I50.33 ACUTE ON CHRONIC DIASTOLIC (CONGESTIVE) HEART FAILURE: ICD-10-CM

## 2025-02-24 DIAGNOSIS — I50.9 ACUTE CONGESTIVE HEART FAILURE, UNSPECIFIED HEART FAILURE TYPE: Primary | ICD-10-CM

## 2025-02-24 DIAGNOSIS — R09.02 HYPOXIA: ICD-10-CM

## 2025-02-24 DIAGNOSIS — L30.9 ECZEMA, UNSPECIFIED TYPE: ICD-10-CM

## 2025-02-24 DIAGNOSIS — R06.02 SHORTNESS OF BREATH: ICD-10-CM

## 2025-02-24 PROBLEM — R09.89 SYMPTOM OF CONGESTIVE HEART FAILURE: Status: ACTIVE | Noted: 2025-02-24

## 2025-02-24 LAB
ALBUMIN SERPL BCP-MCNC: 3.7 G/DL (ref 3.4–5)
ALP SERPL-CCNC: 97 U/L (ref 33–136)
ALT SERPL W P-5'-P-CCNC: 10 U/L (ref 7–45)
ANION GAP BLDV CALCULATED.4IONS-SCNC: 10 MMOL/L (ref 10–25)
ANION GAP SERPL CALC-SCNC: 12 MMOL/L (ref 10–20)
APPEARANCE UR: CLEAR
APTT PPP: 31 SECONDS (ref 27–38)
AST SERPL W P-5'-P-CCNC: 19 U/L (ref 9–39)
BASE EXCESS BLDV CALC-SCNC: 2.5 MMOL/L (ref -2–3)
BASOPHILS # BLD AUTO: 0.06 X10*3/UL (ref 0–0.1)
BASOPHILS NFR BLD AUTO: 0.8 %
BILIRUB SERPL-MCNC: 0.5 MG/DL (ref 0–1.2)
BILIRUB UR STRIP.AUTO-MCNC: NEGATIVE MG/DL
BNP SERPL-MCNC: 188 PG/ML (ref 0–99)
BODY TEMPERATURE: ABNORMAL
BUN SERPL-MCNC: 17 MG/DL (ref 6–23)
CA-I BLDV-SCNC: 1.19 MMOL/L (ref 1.1–1.33)
CALCIUM SERPL-MCNC: 9.2 MG/DL (ref 8.6–10.3)
CARDIAC TROPONIN I PNL SERPL HS: 23 NG/L (ref 0–13)
CHLORIDE BLDV-SCNC: 109 MMOL/L (ref 98–107)
CHLORIDE SERPL-SCNC: 109 MMOL/L (ref 98–107)
CO2 SERPL-SCNC: 27 MMOL/L (ref 21–32)
COLOR UR: ABNORMAL
CREAT SERPL-MCNC: 0.8 MG/DL (ref 0.5–1.05)
EGFRCR SERPLBLD CKD-EPI 2021: 73 ML/MIN/1.73M*2
EOSINOPHIL # BLD AUTO: 0.88 X10*3/UL (ref 0–0.4)
EOSINOPHIL NFR BLD AUTO: 11.5 %
ERYTHROCYTE [DISTWIDTH] IN BLOOD BY AUTOMATED COUNT: 19 % (ref 11.5–14.5)
FLUAV RNA RESP QL NAA+PROBE: NOT DETECTED
FLUBV RNA RESP QL NAA+PROBE: NOT DETECTED
GLUCOSE BLDV-MCNC: 89 MG/DL (ref 74–99)
GLUCOSE SERPL-MCNC: 95 MG/DL (ref 74–99)
GLUCOSE UR STRIP.AUTO-MCNC: NORMAL MG/DL
HCO3 BLDV-SCNC: 27.8 MMOL/L (ref 22–26)
HCT VFR BLD AUTO: 27.4 % (ref 36–46)
HCT VFR BLD EST: 25 % (ref 36–46)
HGB BLD-MCNC: 7.9 G/DL (ref 12–16)
HGB BLDV-MCNC: 8.2 G/DL (ref 12–16)
IMM GRANULOCYTES # BLD AUTO: 0.04 X10*3/UL (ref 0–0.5)
IMM GRANULOCYTES NFR BLD AUTO: 0.5 % (ref 0–0.9)
INHALED O2 CONCENTRATION: 28 %
INR PPP: 1.2 (ref 0.9–1.1)
KETONES UR STRIP.AUTO-MCNC: ABNORMAL MG/DL
LACTATE BLDV-SCNC: 1.3 MMOL/L (ref 0.4–2)
LEUKOCYTE ESTERASE UR QL STRIP.AUTO: ABNORMAL
LYMPHOCYTES # BLD AUTO: 0.99 X10*3/UL (ref 0.8–3)
LYMPHOCYTES NFR BLD AUTO: 12.9 %
MAGNESIUM SERPL-MCNC: 2.18 MG/DL (ref 1.6–2.4)
MCH RBC QN AUTO: 20.6 PG (ref 26–34)
MCHC RBC AUTO-ENTMCNC: 28.8 G/DL (ref 32–36)
MCV RBC AUTO: 72 FL (ref 80–100)
MONOCYTES # BLD AUTO: 0.64 X10*3/UL (ref 0.05–0.8)
MONOCYTES NFR BLD AUTO: 8.4 %
MUCOUS THREADS #/AREA URNS AUTO: NORMAL /LPF
NEUTROPHILS # BLD AUTO: 5.04 X10*3/UL (ref 1.6–5.5)
NEUTROPHILS NFR BLD AUTO: 65.9 %
NITRITE UR QL STRIP.AUTO: NEGATIVE
NRBC BLD-RTO: 0.3 /100 WBCS (ref 0–0)
OXYHGB MFR BLDV: 61.3 % (ref 45–75)
PCO2 BLDV: 46 MM HG (ref 41–51)
PH BLDV: 7.39 PH (ref 7.33–7.43)
PH UR STRIP.AUTO: 6 [PH]
PLATELET # BLD AUTO: 581 X10*3/UL (ref 150–450)
PO2 BLDV: 37 MM HG (ref 35–45)
POTASSIUM BLDV-SCNC: 3.8 MMOL/L (ref 3.5–5.3)
POTASSIUM SERPL-SCNC: 3.7 MMOL/L (ref 3.5–5.3)
PROT SERPL-MCNC: 6.3 G/DL (ref 6.4–8.2)
PROT UR STRIP.AUTO-MCNC: NEGATIVE MG/DL
PROTHROMBIN TIME: 13.3 SECONDS (ref 9.8–12.8)
RBC # BLD AUTO: 3.83 X10*6/UL (ref 4–5.2)
RBC # UR STRIP.AUTO: NEGATIVE MG/DL
RBC #/AREA URNS AUTO: NORMAL /HPF
RSV RNA RESP QL NAA+PROBE: NOT DETECTED
SAO2 % BLDV: 62 % (ref 45–75)
SARS-COV-2 RNA RESP QL NAA+PROBE: NOT DETECTED
SODIUM BLDV-SCNC: 143 MMOL/L (ref 136–145)
SODIUM SERPL-SCNC: 144 MMOL/L (ref 136–145)
SP GR UR STRIP.AUTO: 1.03
SQUAMOUS #/AREA URNS AUTO: NORMAL /HPF
T4 FREE SERPL-MCNC: 0.86 NG/DL (ref 0.61–1.12)
TSH SERPL-ACNC: 4.47 MIU/L (ref 0.44–3.98)
UROBILINOGEN UR STRIP.AUTO-MCNC: NORMAL MG/DL
WBC # BLD AUTO: 7.7 X10*3/UL (ref 4.4–11.3)
WBC #/AREA URNS AUTO: NORMAL /HPF

## 2025-02-24 PROCEDURE — 93005 ELECTROCARDIOGRAM TRACING: CPT

## 2025-02-24 PROCEDURE — 2500000005 HC RX 250 GENERAL PHARMACY W/O HCPCS: Performed by: EMERGENCY MEDICINE

## 2025-02-24 PROCEDURE — 2550000001 HC RX 255 CONTRASTS: Performed by: EMERGENCY MEDICINE

## 2025-02-24 PROCEDURE — 85730 THROMBOPLASTIN TIME PARTIAL: CPT | Performed by: EMERGENCY MEDICINE

## 2025-02-24 PROCEDURE — 87637 SARSCOV2&INF A&B&RSV AMP PRB: CPT | Performed by: EMERGENCY MEDICINE

## 2025-02-24 PROCEDURE — 70487 CT MAXILLOFACIAL W/DYE: CPT | Performed by: RADIOLOGY

## 2025-02-24 PROCEDURE — 94760 N-INVAS EAR/PLS OXIMETRY 1: CPT | Mod: CCI

## 2025-02-24 PROCEDURE — 84132 ASSAY OF SERUM POTASSIUM: CPT | Performed by: EMERGENCY MEDICINE

## 2025-02-24 PROCEDURE — G0378 HOSPITAL OBSERVATION PER HR: HCPCS

## 2025-02-24 PROCEDURE — 71045 X-RAY EXAM CHEST 1 VIEW: CPT

## 2025-02-24 PROCEDURE — 85610 PROTHROMBIN TIME: CPT | Performed by: EMERGENCY MEDICINE

## 2025-02-24 PROCEDURE — 83735 ASSAY OF MAGNESIUM: CPT | Performed by: EMERGENCY MEDICINE

## 2025-02-24 PROCEDURE — 2500000004 HC RX 250 GENERAL PHARMACY W/ HCPCS (ALT 636 FOR OP/ED): Performed by: EMERGENCY MEDICINE

## 2025-02-24 PROCEDURE — 83880 ASSAY OF NATRIURETIC PEPTIDE: CPT | Performed by: EMERGENCY MEDICINE

## 2025-02-24 PROCEDURE — 71275 CT ANGIOGRAPHY CHEST: CPT

## 2025-02-24 PROCEDURE — 99285 EMERGENCY DEPT VISIT HI MDM: CPT | Mod: 25 | Performed by: EMERGENCY MEDICINE

## 2025-02-24 PROCEDURE — 96374 THER/PROPH/DIAG INJ IV PUSH: CPT

## 2025-02-24 PROCEDURE — 36415 COLL VENOUS BLD VENIPUNCTURE: CPT | Performed by: EMERGENCY MEDICINE

## 2025-02-24 PROCEDURE — 85025 COMPLETE CBC W/AUTO DIFF WBC: CPT | Performed by: EMERGENCY MEDICINE

## 2025-02-24 PROCEDURE — 84484 ASSAY OF TROPONIN QUANT: CPT | Performed by: EMERGENCY MEDICINE

## 2025-02-24 PROCEDURE — 70487 CT MAXILLOFACIAL W/DYE: CPT

## 2025-02-24 PROCEDURE — 81001 URINALYSIS AUTO W/SCOPE: CPT | Performed by: EMERGENCY MEDICINE

## 2025-02-24 PROCEDURE — 87086 URINE CULTURE/COLONY COUNT: CPT | Mod: GENLAB | Performed by: EMERGENCY MEDICINE

## 2025-02-24 PROCEDURE — 84443 ASSAY THYROID STIM HORMONE: CPT | Performed by: EMERGENCY MEDICINE

## 2025-02-24 PROCEDURE — 84439 ASSAY OF FREE THYROXINE: CPT | Performed by: EMERGENCY MEDICINE

## 2025-02-24 RX ORDER — GUAIFENESIN 600 MG/1
600 TABLET, EXTENDED RELEASE ORAL EVERY 12 HOURS PRN
Status: DISCONTINUED | OUTPATIENT
Start: 2025-02-24 | End: 2025-03-01 | Stop reason: HOSPADM

## 2025-02-24 RX ORDER — IBUPROFEN 200 MG
600 TABLET ORAL DAILY PRN
COMMUNITY

## 2025-02-24 RX ORDER — PANTOPRAZOLE SODIUM 40 MG/10ML
40 INJECTION, POWDER, LYOPHILIZED, FOR SOLUTION INTRAVENOUS
Status: DISCONTINUED | OUTPATIENT
Start: 2025-02-25 | End: 2025-03-01 | Stop reason: HOSPADM

## 2025-02-24 RX ORDER — ONDANSETRON 4 MG/1
4 TABLET, ORALLY DISINTEGRATING ORAL EVERY 8 HOURS PRN
Status: DISCONTINUED | OUTPATIENT
Start: 2025-02-24 | End: 2025-03-01 | Stop reason: HOSPADM

## 2025-02-24 RX ORDER — ENOXAPARIN SODIUM 100 MG/ML
40 INJECTION SUBCUTANEOUS EVERY 24 HOURS
Status: DISCONTINUED | OUTPATIENT
Start: 2025-02-24 | End: 2025-03-01 | Stop reason: HOSPADM

## 2025-02-24 RX ORDER — ACETAMINOPHEN 325 MG/1
650 TABLET ORAL EVERY 4 HOURS PRN
Status: DISCONTINUED | OUTPATIENT
Start: 2025-02-24 | End: 2025-03-01 | Stop reason: HOSPADM

## 2025-02-24 RX ORDER — PANTOPRAZOLE SODIUM 40 MG/1
40 TABLET, DELAYED RELEASE ORAL
Status: DISCONTINUED | OUTPATIENT
Start: 2025-02-25 | End: 2025-03-01 | Stop reason: HOSPADM

## 2025-02-24 RX ORDER — ACETAMINOPHEN 160 MG/5ML
650 SOLUTION ORAL EVERY 4 HOURS PRN
Status: DISCONTINUED | OUTPATIENT
Start: 2025-02-24 | End: 2025-03-01 | Stop reason: HOSPADM

## 2025-02-24 RX ORDER — ACETAMINOPHEN 650 MG/1
650 SUPPOSITORY RECTAL EVERY 4 HOURS PRN
Status: DISCONTINUED | OUTPATIENT
Start: 2025-02-24 | End: 2025-03-01 | Stop reason: HOSPADM

## 2025-02-24 RX ORDER — FUROSEMIDE 10 MG/ML
20 INJECTION INTRAMUSCULAR; INTRAVENOUS ONCE
Status: COMPLETED | OUTPATIENT
Start: 2025-02-24 | End: 2025-02-24

## 2025-02-24 RX ORDER — TALC
6 POWDER (GRAM) TOPICAL NIGHTLY PRN
Status: DISCONTINUED | OUTPATIENT
Start: 2025-02-24 | End: 2025-03-01 | Stop reason: HOSPADM

## 2025-02-24 RX ORDER — GUAIFENESIN/DEXTROMETHORPHAN 100-10MG/5
5 SYRUP ORAL EVERY 4 HOURS PRN
Status: DISCONTINUED | OUTPATIENT
Start: 2025-02-24 | End: 2025-03-01 | Stop reason: HOSPADM

## 2025-02-24 RX ORDER — POLYETHYLENE GLYCOL 3350 17 G/17G
17 POWDER, FOR SOLUTION ORAL DAILY
Status: DISCONTINUED | OUTPATIENT
Start: 2025-02-24 | End: 2025-03-01 | Stop reason: HOSPADM

## 2025-02-24 RX ORDER — ONDANSETRON HYDROCHLORIDE 2 MG/ML
4 INJECTION, SOLUTION INTRAVENOUS EVERY 8 HOURS PRN
Status: DISCONTINUED | OUTPATIENT
Start: 2025-02-24 | End: 2025-03-01 | Stop reason: HOSPADM

## 2025-02-24 RX ADMIN — Medication 2 L/MIN: at 10:20

## 2025-02-24 RX ADMIN — FUROSEMIDE 20 MG: 10 INJECTION, SOLUTION INTRAMUSCULAR; INTRAVENOUS at 14:35

## 2025-02-24 RX ADMIN — IOHEXOL 75 ML: 350 INJECTION, SOLUTION INTRAVENOUS at 12:36

## 2025-02-24 SDOH — ECONOMIC STABILITY: FOOD INSECURITY: WITHIN THE PAST 12 MONTHS, THE FOOD YOU BOUGHT JUST DIDN'T LAST AND YOU DIDN'T HAVE MONEY TO GET MORE.: NEVER TRUE

## 2025-02-24 SDOH — SOCIAL STABILITY: SOCIAL INSECURITY
WITHIN THE LAST YEAR, HAVE YOU BEEN KICKED, HIT, SLAPPED, OR OTHERWISE PHYSICALLY HURT BY YOUR PARTNER OR EX-PARTNER?: NO

## 2025-02-24 SDOH — SOCIAL STABILITY: SOCIAL INSECURITY: WITHIN THE LAST YEAR, HAVE YOU BEEN HUMILIATED OR EMOTIONALLY ABUSED IN OTHER WAYS BY YOUR PARTNER OR EX-PARTNER?: NO

## 2025-02-24 SDOH — SOCIAL STABILITY: SOCIAL INSECURITY: WITHIN THE LAST YEAR, HAVE YOU BEEN AFRAID OF YOUR PARTNER OR EX-PARTNER?: NO

## 2025-02-24 SDOH — ECONOMIC STABILITY: FOOD INSECURITY: WITHIN THE PAST 12 MONTHS, YOU WORRIED THAT YOUR FOOD WOULD RUN OUT BEFORE YOU GOT THE MONEY TO BUY MORE.: NEVER TRUE

## 2025-02-24 SDOH — SOCIAL STABILITY: SOCIAL INSECURITY: DO YOU FEEL UNSAFE GOING BACK TO THE PLACE WHERE YOU ARE LIVING?: NO

## 2025-02-24 SDOH — SOCIAL STABILITY: SOCIAL INSECURITY: ARE THERE ANY APPARENT SIGNS OF INJURIES/BEHAVIORS THAT COULD BE RELATED TO ABUSE/NEGLECT?: NO

## 2025-02-24 SDOH — SOCIAL STABILITY: SOCIAL INSECURITY
WITHIN THE LAST YEAR, HAVE YOU BEEN RAPED OR FORCED TO HAVE ANY KIND OF SEXUAL ACTIVITY BY YOUR PARTNER OR EX-PARTNER?: NO

## 2025-02-24 SDOH — SOCIAL STABILITY: SOCIAL INSECURITY: WERE YOU ABLE TO COMPLETE ALL THE BEHAVIORAL HEALTH SCREENINGS?: YES

## 2025-02-24 SDOH — SOCIAL STABILITY: SOCIAL INSECURITY: ARE YOU OR HAVE YOU BEEN THREATENED OR ABUSED PHYSICALLY, EMOTIONALLY, OR SEXUALLY BY ANYONE?: NO

## 2025-02-24 SDOH — SOCIAL STABILITY: SOCIAL INSECURITY: HAVE YOU HAD THOUGHTS OF HARMING ANYONE ELSE?: NO

## 2025-02-24 SDOH — SOCIAL STABILITY: SOCIAL INSECURITY: DOES ANYONE TRY TO KEEP YOU FROM HAVING/CONTACTING OTHER FRIENDS OR DOING THINGS OUTSIDE YOUR HOME?: NO

## 2025-02-24 SDOH — SOCIAL STABILITY: SOCIAL INSECURITY: ABUSE: ADULT

## 2025-02-24 SDOH — ECONOMIC STABILITY: INCOME INSECURITY: IN THE PAST 12 MONTHS HAS THE ELECTRIC, GAS, OIL, OR WATER COMPANY THREATENED TO SHUT OFF SERVICES IN YOUR HOME?: NO

## 2025-02-24 SDOH — SOCIAL STABILITY: SOCIAL INSECURITY: HAS ANYONE EVER THREATENED TO HURT YOUR FAMILY OR YOUR PETS?: NO

## 2025-02-24 SDOH — SOCIAL STABILITY: SOCIAL INSECURITY: DO YOU FEEL ANYONE HAS EXPLOITED OR TAKEN ADVANTAGE OF YOU FINANCIALLY OR OF YOUR PERSONAL PROPERTY?: NO

## 2025-02-24 ASSESSMENT — LIFESTYLE VARIABLES
HOW OFTEN DO YOU HAVE A DRINK CONTAINING ALCOHOL: NEVER
AUDIT-C TOTAL SCORE: 0
SKIP TO QUESTIONS 9-10: 1
AUDIT-C TOTAL SCORE: 0
HOW MANY STANDARD DRINKS CONTAINING ALCOHOL DO YOU HAVE ON A TYPICAL DAY: PATIENT DOES NOT DRINK
HOW OFTEN DO YOU HAVE 6 OR MORE DRINKS ON ONE OCCASION: NEVER

## 2025-02-24 ASSESSMENT — COLUMBIA-SUICIDE SEVERITY RATING SCALE - C-SSRS
2. HAVE YOU ACTUALLY HAD ANY THOUGHTS OF KILLING YOURSELF?: NO
6. HAVE YOU EVER DONE ANYTHING, STARTED TO DO ANYTHING, OR PREPARED TO DO ANYTHING TO END YOUR LIFE?: NO
1. IN THE PAST MONTH, HAVE YOU WISHED YOU WERE DEAD OR WISHED YOU COULD GO TO SLEEP AND NOT WAKE UP?: NO

## 2025-02-24 ASSESSMENT — ACTIVITIES OF DAILY LIVING (ADL)
ADEQUATE_TO_COMPLETE_ADL: YES
HEARING - LEFT EAR: DIFFICULTY WITH NOISE
LACK_OF_TRANSPORTATION: NO
FEEDING YOURSELF: INDEPENDENT
JUDGMENT_ADEQUATE_SAFELY_COMPLETE_DAILY_ACTIVITIES: NO
HEARING - RIGHT EAR: DIFFICULTY WITH NOISE
GROOMING: INDEPENDENT
ASSISTIVE_DEVICE: CANE;EYEGLASSES
WALKS IN HOME: NEEDS ASSISTANCE
DRESSING YOURSELF: INDEPENDENT
TOILETING: INDEPENDENT
LACK_OF_TRANSPORTATION: NO
BATHING: INDEPENDENT
PATIENT'S MEMORY ADEQUATE TO SAFELY COMPLETE DAILY ACTIVITIES?: NO

## 2025-02-24 ASSESSMENT — PAIN SCALES - GENERAL: PAINLEVEL_OUTOF10: 0 - NO PAIN

## 2025-02-24 ASSESSMENT — COGNITIVE AND FUNCTIONAL STATUS - GENERAL
DAILY ACTIVITIY SCORE: 24
WALKING IN HOSPITAL ROOM: A LITTLE
PATIENT BASELINE BEDBOUND: NO
STANDING UP FROM CHAIR USING ARMS: A LITTLE
MOVING TO AND FROM BED TO CHAIR: A LITTLE
CLIMB 3 TO 5 STEPS WITH RAILING: A LITTLE
MOBILITY SCORE: 20

## 2025-02-24 ASSESSMENT — PAIN - FUNCTIONAL ASSESSMENT: PAIN_FUNCTIONAL_ASSESSMENT: 0-10

## 2025-02-24 NOTE — PROGRESS NOTES
Pharmacy Medication History Review    Nicole Bobo is a 84 y.o. female admitted for Symptom of congestive heart failure. Pharmacy reviewed the patient's axizd-uv-ncrkdajdt medications and allergies for accuracy.    Sources used to confirm medication list: Informant interview  Informant: Spouse/Significant Other (Kendall)  Informant credibility: Good (Able to recall medication, indication, strength, frequency, and/or prescriber for >75% of medications).    Total number of adjustments: 3     Medications Added Medications Removed Medications Adjusted  Adjustments Made   Ibuprofen Centrum Silver      Kenalog Cream       The list below reflectives the updated PTA list. Please review each medication in order reconciliation for additional clarification and justification.  Prior to Admission medications    Medication Sig Start Date End Date Taking? Authorizing Provider   desoximetasone (Topicort) 0.25 % cream APPLY ONE APPLICATION TOPICALLY TO AFFECTED AREA DAILY 4/24/23   Historical Provider, MD   ibuprofen 200 mg tablet Take 3 tablets (600 mg) by mouth once daily as needed.    Historical Provider, MD   vit A/vit C/vit E/zinc/copper (PRESERVISION AREDS ORAL) Take 1 tablet by mouth 2 times a day.   Yes Historical Provider, MD   multivit-iron-minerals-folic acid (Centrum Silver) 0.4 mg-300 mcg- 250 mcg tab Take by mouth. 1/20/14 2/24/25  Historical Provider, MD   triamcinolone (Kenalog) 0.1 % cream APPLY TO AFFECTED AREA(S) TWICE A DAY FOR 14 DAYS  THEN NEED BREAK FOR 5 DAYS. 8/11/23 2/24/25  Historical Provider, MD        The list below reflectives the updated allergy list. Please review each documented allergy for additional clarification and justification.  Allergies  Reviewed by Cecy Wagner RN on 2/24/2025        Severity Reactions Comments    Animal Dander Not Specified Unknown     Dog Dander Not Specified Other             Below are additional concerns with the patient's PTA list.  Spoke w/pt's ,  Kendall, at bedside. Reviewed PTA list. Please review changes made to PTA list in the chart above.    Tammy Avery CPhT  Medication History Pharmacy Technician  ARNALDO 8-4:30  Available via [employer] Secure Chat  OR  [phone]

## 2025-02-24 NOTE — ED PROVIDER NOTES
HPI   Chief Complaint   Patient presents with    Facial Swelling     Waking up with facial swelling x1 week. Gradually goes away throughout the day.       84-year-old female with history of eczema, iron deficiency anemia presents with shortness of breath, facial swelling.  Every morning for the past week she has had facial swelling predominantly involving both of her eyes that improves throughout the day.  There is no tongue swelling or throat closing sensation.  She also has been short of breath with activity over the past week.  Shortness of breath improves with rest.  Denies chest pain palpitations lightheadedness falls or syncopal episodes.  Her eczema has been bothering her recently as well.      History provided by:  Patient and medical records          Patient History   Past Medical History:   Diagnosis Date    Anesthesia of skin 08/08/2017    Arm numbness    Other constipation 09/23/2021    Chronic constipation    Pain in left hip 06/08/2016    Hip pain, acute, left    Personal history of other diseases of the respiratory system 11/04/2014    History of sinusitis    Personal history of pneumonia (recurrent) 11/04/2014    History of pneumonia    Unspecified cataract 02/13/2019    Cataracts, bilateral     Past Surgical History:   Procedure Laterality Date    APPENDECTOMY  01/20/2014    Appendectomy    TONSILLECTOMY  01/20/2014    Tonsillectomy     Family History   Problem Relation Name Age of Onset    Heart failure Mother      Alcohol abuse Father      Diabetes Father      Diabetes Brother       Social History     Tobacco Use    Smoking status: Never    Smokeless tobacco: Never   Vaping Use    Vaping status: Never Used   Substance Use Topics    Alcohol use: Never    Drug use: Never       Physical Exam   ED Triage Vitals [02/24/25 0955]   Temperature Heart Rate Respirations BP   36.1 °C (97 °F) 92 20 162/88      Pulse Ox Temp Source Heart Rate Source Patient Position   (!) 93 % Temporal Monitor Sitting      BP  Location FiO2 (%)     Right arm --       Physical Exam  Vitals and nursing note reviewed.   Constitutional:       General: She is not in acute distress.     Appearance: Normal appearance. She is well-developed.   HENT:      Head: Normocephalic and atraumatic.      Comments: Left periorbital edema.  No pain with extraocular motion of bilateral eyes.    Oropharynx clear.  Airway patent.  No trismus.  Handling secretions.  Eyes:      Conjunctiva/sclera: Conjunctivae normal.   Cardiovascular:      Rate and Rhythm: Normal rate and regular rhythm.      Pulses: Normal pulses.      Heart sounds: No murmur heard.  Pulmonary:      Effort: Pulmonary effort is normal. No respiratory distress.      Breath sounds: Rhonchi (Bilateral bases) present.   Abdominal:      Palpations: Abdomen is soft.      Tenderness: There is no abdominal tenderness.   Musculoskeletal:         General: No swelling.      Cervical back: Neck supple.      Right lower le+ Pitting Edema present.      Left lower le+ Pitting Edema present.   Skin:     General: Skin is warm and dry.      Capillary Refill: Capillary refill takes less than 2 seconds.      Findings: Erythema present.      Comments: Diffuse eczematous rash with excoriations   Neurological:      General: No focal deficit present.      Mental Status: She is alert and oriented to person, place, and time.      Cranial Nerves: No cranial nerve deficit.      Sensory: No sensory deficit.      Motor: No weakness.   Psychiatric:         Mood and Affect: Mood normal.           ED Course & MDM   ED Course as of 25 1537   Mon 2025   1042 84-year-old female with shortness of breath hypoxia facial swelling.  On exam, she has diffuse eczema.  She has left periorbital edema and mild diffuse facial redness.  Doubt preseptal or postseptal cellulitis of the left eye given history and presentation.    Constellation of symptoms could be a number of things.  Differential diagnosis includes CHF,  ACS, pneumonia, COVID flu RSV, PE, renal failure with volume overload, or other cause of similar symptoms.    Labs flu COVID RSV swab CT facial bones chest x-ray.  CT chest PE study if renal function allows.      Hypoxic to 87% on room air after transferring from wheelchair to cot.  Placed on 2 L nasal cannula oxygen.    Will reevaluate after initial workup, treatment [BT]   1121 ECG 12 lead  EKG interpreted by me shows sinus rhythm with rate of 81.  Normal intervals.  Normal axis.  Nonspecific ST-T changes.  No acute injury pattern [BT]   1535 Venous blood gas unremarkable aside from anemia with hemoglobin of 8.2.  COVID flu RSV negative.  BNP elevated at 188.  CT chest shows bilateral interstitial edema and small bilateral pleural effusions.  Lung nodule 7 mm on the right with repeat CT in 6 months recommended.    CT face just showed some preseptal edema on the left greater than right.    Presentation most consistent with CHF/volume overload.  Given new oxygen requirement and no prior history of CHF she will be hospitalized.  Discussed with GHULAM Bentley who will hospitalize for CHF / volume overload. [BT]      ED Course User Index  [BT] Himanshu Falcon,          Diagnoses as of 02/24/25 1537   Facial swelling   Shortness of breath   Hypoxia   Eczema, unspecified type   Acute pulmonary edema   Acute congestive heart failure, unspecified heart failure type   Bilateral pleural effusion     CT angio chest for pulmonary embolism   Final Result   1. No acute pulmonary embolism to the segmental level.   2. Bilateral interstitial edema.   3. Small bilateral pleural effusions with atelectasis/infiltrate,   left-greater-than-right.   4. Right middle lobe 7 mm nodule, for which follow-up CT chest is   recommended in 6 months. (Reference: Kun Echevarria et al.,   Guidelines for management of incidental pulmonary nodules detected on   CT images: From the Fleischner Society 2017, Radiology. 2017 Jul;284   (1):228-243.)         MACRO:   None        Signed by: Jodee Quiñonez 2/24/2025 1:05 PM   Dictation workstation:   [id number]      CT facial bones w IV contrast   Final Result   The preseptal facial soft tissues have mild edema/inflammation more   so on the left with no intra orbital mass or inflammation noted.        MACRO:   None        Signed by: Paul Hdz 2/24/2025 12:49 PM   Dictation workstation:   [id number]      XR chest 1 view   Final Result   New retrocardiac and left basilar opacification, which could relate   to underlying pleural effusion with superimposed infiltrate.   Follow-up chest radiograph in 8-12 weeks after treatment or further   evaluation with CT chest is recommended to exclude underlying   neoplasm.        MACRO:   Critical Finding:  See findings. Notification was initiated on   2/24/2025 at 12:12 pm by  Jodee Quiñonez.  (**-YCF-**) Instructions:        Signed by: Jodee Quiñonez 2/24/2025 12:12 PM   Dictation workstation:   [id number]                      No data recorded     Hayden Coma Scale Score: 15 (02/24/25 1034 : Cecy Wagner, ELIZ)                           Medical Decision Making      Procedure  Procedures     Himanshu Falcon,   02/24/25 1537

## 2025-02-24 NOTE — CARE PLAN
Pt arrives from ED. Placed onto continuous pulse ox and cardiac monitor. Resps even. Skin w/d, severe eczema throughout. Pt confused to time, reluctant to answer questions. Discussed safety with supervisor, plan to move patient closer to nurse's station.

## 2025-02-24 NOTE — ED TRIAGE NOTES
Arrives to ED by private vehicle. Here for facial swelling. Patient states she has been waking every day like this and it slowly goes away during the day. Eyes puffy. Skin itchy, red, raised.

## 2025-02-25 ENCOUNTER — APPOINTMENT (OUTPATIENT)
Dept: CARDIOLOGY | Facility: HOSPITAL | Age: 85
End: 2025-02-25
Payer: MEDICARE

## 2025-02-25 PROBLEM — R79.89 ELEVATED TROPONIN: Status: ACTIVE | Noted: 2025-02-25

## 2025-02-25 PROBLEM — N30.00 ACUTE CYSTITIS WITHOUT HEMATURIA: Status: ACTIVE | Noted: 2025-02-25

## 2025-02-25 PROBLEM — R09.02 HYPOXIA: Status: ACTIVE | Noted: 2025-02-25

## 2025-02-25 PROBLEM — R79.89 ELEVATED BRAIN NATRIURETIC PEPTIDE (BNP) LEVEL: Status: ACTIVE | Noted: 2025-02-25

## 2025-02-25 PROBLEM — I50.9 ACUTE CONGESTIVE HEART FAILURE, UNSPECIFIED HEART FAILURE TYPE: Status: ACTIVE | Noted: 2025-02-25

## 2025-02-25 LAB
ANION GAP SERPL CALC-SCNC: 15 MMOL/L (ref 10–20)
BUN SERPL-MCNC: 17 MG/DL (ref 6–23)
CALCIUM SERPL-MCNC: 8.4 MG/DL (ref 8.6–10.3)
CARDIAC TROPONIN I PNL SERPL HS: 19 NG/L (ref 0–13)
CHLORIDE SERPL-SCNC: 110 MMOL/L (ref 98–107)
CO2 SERPL-SCNC: 22 MMOL/L (ref 21–32)
CREAT SERPL-MCNC: 0.79 MG/DL (ref 0.5–1.05)
EGFRCR SERPLBLD CKD-EPI 2021: 74 ML/MIN/1.73M*2
ERYTHROCYTE [DISTWIDTH] IN BLOOD BY AUTOMATED COUNT: 19.7 % (ref 11.5–14.5)
FERRITIN SERPL-MCNC: 16 NG/ML (ref 8–150)
GLUCOSE SERPL-MCNC: 89 MG/DL (ref 74–99)
HCT VFR BLD AUTO: 28.2 % (ref 36–46)
HGB BLD-MCNC: 7.5 G/DL (ref 12–16)
HOLD SPECIMEN: NORMAL
IRON SATN MFR SERPL: 5 % (ref 25–45)
IRON SERPL-MCNC: 17 UG/DL (ref 35–150)
MCH RBC QN AUTO: 20.5 PG (ref 26–34)
MCHC RBC AUTO-ENTMCNC: 26.6 G/DL (ref 32–36)
MCV RBC AUTO: 77 FL (ref 80–100)
NRBC BLD-RTO: 0.3 /100 WBCS (ref 0–0)
PLATELET # BLD AUTO: 262 X10*3/UL (ref 150–450)
POTASSIUM SERPL-SCNC: 3.7 MMOL/L (ref 3.5–5.3)
RBC # BLD AUTO: 3.65 X10*6/UL (ref 4–5.2)
SODIUM SERPL-SCNC: 143 MMOL/L (ref 136–145)
TIBC SERPL-MCNC: 376 UG/DL (ref 240–445)
UIBC SERPL-MCNC: 359 UG/DL (ref 110–370)
WBC # BLD AUTO: 7.4 X10*3/UL (ref 4.4–11.3)

## 2025-02-25 PROCEDURE — 36415 COLL VENOUS BLD VENIPUNCTURE: CPT

## 2025-02-25 PROCEDURE — 93306 TTE W/DOPPLER COMPLETE: CPT | Performed by: INTERNAL MEDICINE

## 2025-02-25 PROCEDURE — 93306 TTE W/DOPPLER COMPLETE: CPT | Mod: IPSPLIT

## 2025-02-25 PROCEDURE — 82728 ASSAY OF FERRITIN: CPT

## 2025-02-25 PROCEDURE — 1200000002 HC GENERAL ROOM WITH TELEMETRY DAILY: Mod: IPSPLIT

## 2025-02-25 PROCEDURE — 84484 ASSAY OF TROPONIN QUANT: CPT

## 2025-02-25 PROCEDURE — 2500000001 HC RX 250 WO HCPCS SELF ADMINISTERED DRUGS (ALT 637 FOR MEDICARE OP): Mod: IPSPLIT

## 2025-02-25 PROCEDURE — 2500000004 HC RX 250 GENERAL PHARMACY W/ HCPCS (ALT 636 FOR OP/ED): Mod: IPSPLIT

## 2025-02-25 PROCEDURE — 83540 ASSAY OF IRON: CPT

## 2025-02-25 PROCEDURE — 85027 COMPLETE CBC AUTOMATED: CPT

## 2025-02-25 PROCEDURE — 94760 N-INVAS EAR/PLS OXIMETRY 1: CPT | Mod: IPSPLIT

## 2025-02-25 PROCEDURE — 80048 BASIC METABOLIC PNL TOTAL CA: CPT

## 2025-02-25 PROCEDURE — 99222 1ST HOSP IP/OBS MODERATE 55: CPT | Performed by: NURSE PRACTITIONER

## 2025-02-25 RX ORDER — FLUOCINONIDE 0.5 MG/G
CREAM TOPICAL 2 TIMES DAILY
Status: DISCONTINUED | OUTPATIENT
Start: 2025-02-25 | End: 2025-03-01 | Stop reason: HOSPADM

## 2025-02-25 RX ORDER — PREDNISONE 20 MG/1
40 TABLET ORAL DAILY
Status: COMPLETED | OUTPATIENT
Start: 2025-02-25 | End: 2025-03-01

## 2025-02-25 RX ORDER — FERROUS SULFATE 325(65) MG
65 TABLET ORAL
Status: DISCONTINUED | OUTPATIENT
Start: 2025-02-26 | End: 2025-03-01 | Stop reason: HOSPADM

## 2025-02-25 RX ORDER — CEFTRIAXONE 1 G/50ML
1 INJECTION, SOLUTION INTRAVENOUS EVERY 24 HOURS
Status: DISCONTINUED | OUTPATIENT
Start: 2025-02-25 | End: 2025-02-27

## 2025-02-25 RX ORDER — DIPHENHYDRAMINE HCL 25 MG
25 CAPSULE ORAL EVERY 6 HOURS PRN
Status: DISCONTINUED | OUTPATIENT
Start: 2025-02-25 | End: 2025-02-25

## 2025-02-25 RX ORDER — FUROSEMIDE 10 MG/ML
20 INJECTION INTRAMUSCULAR; INTRAVENOUS ONCE
Status: COMPLETED | OUTPATIENT
Start: 2025-02-25 | End: 2025-02-25

## 2025-02-25 RX ORDER — DIPHENHYDRAMINE HCL 25 MG
25 CAPSULE ORAL EVERY 6 HOURS PRN
Status: DISCONTINUED | OUTPATIENT
Start: 2025-02-25 | End: 2025-03-01 | Stop reason: HOSPADM

## 2025-02-25 RX ADMIN — CEFTRIAXONE 1 G: 1 INJECTION, SOLUTION INTRAVENOUS at 09:36

## 2025-02-25 RX ADMIN — DIPHENHYDRAMINE HYDROCHLORIDE, ZINC ACETATE: 2; .1 CREAM TOPICAL at 09:33

## 2025-02-25 RX ADMIN — FUROSEMIDE 20 MG: 10 INJECTION, SOLUTION INTRAVENOUS at 11:35

## 2025-02-25 RX ADMIN — PANTOPRAZOLE SODIUM 40 MG: 40 TABLET, DELAYED RELEASE ORAL at 06:17

## 2025-02-25 RX ADMIN — FLUOCINONIDE: 0.5 CREAM TOPICAL at 20:30

## 2025-02-25 RX ADMIN — PREDNISONE 40 MG: 20 TABLET ORAL at 11:55

## 2025-02-25 RX ADMIN — FLUOCINONIDE: 0.5 CREAM TOPICAL at 11:53

## 2025-02-25 ASSESSMENT — PAIN SCALES - GENERAL
PAINLEVEL_OUTOF10: 0 - NO PAIN

## 2025-02-25 ASSESSMENT — COGNITIVE AND FUNCTIONAL STATUS - GENERAL
CLIMB 3 TO 5 STEPS WITH RAILING: A LITTLE
WALKING IN HOSPITAL ROOM: A LITTLE
MOBILITY SCORE: 20
STANDING UP FROM CHAIR USING ARMS: A LITTLE
CLIMB 3 TO 5 STEPS WITH RAILING: A LITTLE
DAILY ACTIVITIY SCORE: 24
MOBILITY SCORE: 20
MOVING TO AND FROM BED TO CHAIR: A LITTLE
WALKING IN HOSPITAL ROOM: A LITTLE
MOVING TO AND FROM BED TO CHAIR: A LITTLE
STANDING UP FROM CHAIR USING ARMS: A LITTLE
DAILY ACTIVITIY SCORE: 24

## 2025-02-25 ASSESSMENT — ACTIVITIES OF DAILY LIVING (ADL): LACK_OF_TRANSPORTATION: NO

## 2025-02-25 ASSESSMENT — PAIN - FUNCTIONAL ASSESSMENT: PAIN_FUNCTIONAL_ASSESSMENT: 0-10

## 2025-02-25 NOTE — H&P
History Of Present Illness  Nicole Bobo is a 84 y.o. female presenting with reported shortness of breath and facial swelling. Patient is a poor historian but spouse reports that she has been more short of breath with her usual activity lately. He also notes that she has periorbital swelling most mornings that improves throughout the day; this has been going on for about a week. No lip/tongue swelling or difficulty swallowing. Patient has not had any fevers, chills, or cough. She has been eating and drinking normally per spouse. He brought her to the ED for further evaluation. She was placed on 2L O2 via NC for SpO2 87% on room air. Labs were significant for , trop 23, H/H 7.9/27.4. UA with trace ketones, 75 leuk esterase. CTA chest was negative for PE, showed bilateral interstitial edema with left greater than right pleural effusions. Patient was admitted to med/surg with cardiology consulted for further workup.    On exam this morning, patient is forgetful. She says she isn't sure why she's here. She denies any chest pain, cough, or shortness of breath and is on room air. Her biggest complaint is whole body itchiness due to her chronic eczema; triamcinolone and Benadryl creams added today. Will also start prednisone PO x 5 days.      Past Medical History  Past Medical History:   Diagnosis Date    Anesthesia of skin 08/08/2017    Arm numbness    Other constipation 09/23/2021    Chronic constipation    Pain in left hip 06/08/2016    Hip pain, acute, left    Personal history of other diseases of the respiratory system 11/04/2014    History of sinusitis    Personal history of pneumonia (recurrent) 11/04/2014    History of pneumonia    Unspecified cataract 02/13/2019    Cataracts, bilateral       Surgical History  Past Surgical History:   Procedure Laterality Date    APPENDECTOMY  01/20/2014    Appendectomy    TONSILLECTOMY  01/20/2014    Tonsillectomy        Social History  She reports that she has never  "smoked. She has never used smokeless tobacco. She reports that she does not drink alcohol and does not use drugs.    Family History  Family History   Problem Relation Name Age of Onset    Heart failure Mother      Alcohol abuse Father      Diabetes Father      Diabetes Brother          Allergies  Animal dander and Dog dander    Review of Systems   Unable to perform ROS: Dementia        Physical Exam  Constitutional:       General: She is not in acute distress.     Appearance: She is not toxic-appearing.   HENT:      Head: Normocephalic and atraumatic.      Mouth/Throat:      Mouth: Mucous membranes are moist.   Eyes:      Conjunctiva/sclera: Conjunctivae normal.      Comments: No periorbital edema    Cardiovascular:      Rate and Rhythm: Normal rate and regular rhythm.   Pulmonary:      Effort: No respiratory distress.      Breath sounds: Rales present. No wheezing.      Comments: On room air, SpO2 94%  Abdominal:      General: There is no distension.      Palpations: Abdomen is soft.      Tenderness: There is no abdominal tenderness.   Musculoskeletal:      Right lower leg: No edema.      Left lower leg: No edema.   Skin:     General: Skin is warm and dry.      Findings: Rash (diffuse eczematous rash) present.      Comments: Excoriations to bilateral lower extremities with open scabs    Neurological:      Mental Status: She is alert. Mental status is at baseline. She is disoriented.          Last Recorded Vitals  Blood pressure 127/54, pulse 80, temperature 35.7 °C (96.3 °F), temperature source Temporal, resp. rate 16, height 1.6 m (5' 3\"), weight 63.2 kg (139 lb 5.3 oz), SpO2 99%.    Relevant Results        Scheduled medications  cefTRIAXone, 1 g, intravenous, q24h  enoxaparin, 40 mg, subcutaneous, q24h  influenza, 0.5 mL, intramuscular, During hospitalization  fluocinonide, , Topical, BID  pantoprazole, 40 mg, oral, Daily before breakfast   Or  pantoprazole, 40 mg, intravenous, Daily before breakfast  perflutren " lipid microspheres, 0.5-10 mL of dilution, intravenous, Once in imaging  perflutren protein A microsphere, 0.5 mL, intravenous, Once in imaging  pneumococcal polysaccharide, 0.5 mL, intramuscular, During hospitalization  polyethylene glycol, 17 g, oral, Daily  predniSONE, 40 mg, oral, Daily  sulfur hexafluoride microsphr, 2 mL, intravenous, Once in imaging      Continuous medications     PRN medications  PRN medications: acetaminophen **OR** acetaminophen **OR** acetaminophen, acetaminophen **OR** acetaminophen **OR** acetaminophen, benzocaine-menthol, dextromethorphan-guaifenesin, diphenhydrAMINE, diphenhydramine-zinc acetate, guaiFENesin, melatonin, ondansetron ODT **OR** ondansetron, oxygen    Results for orders placed or performed during the hospital encounter of 02/24/25 (from the past 24 hours)   CBC   Result Value Ref Range    WBC 7.4 4.4 - 11.3 x10*3/uL    nRBC 0.3 (H) 0.0 - 0.0 /100 WBCs    RBC 3.65 (L) 4.00 - 5.20 x10*6/uL    Hemoglobin 7.5 (L) 12.0 - 16.0 g/dL    Hematocrit 28.2 (L) 36.0 - 46.0 %    MCV 77 (L) 80 - 100 fL    MCH 20.5 (L) 26.0 - 34.0 pg    MCHC 26.6 (L) 32.0 - 36.0 g/dL    RDW 19.7 (H) 11.5 - 14.5 %    Platelets 262 150 - 450 x10*3/uL   Basic metabolic panel   Result Value Ref Range    Glucose 89 74 - 99 mg/dL    Sodium 143 136 - 145 mmol/L    Potassium 3.7 3.5 - 5.3 mmol/L    Chloride 110 (H) 98 - 107 mmol/L    Bicarbonate 22 21 - 32 mmol/L    Anion Gap 15 10 - 20 mmol/L    Urea Nitrogen 17 6 - 23 mg/dL    Creatinine 0.79 0.50 - 1.05 mg/dL    eGFR 74 >60 mL/min/1.73m*2    Calcium 8.4 (L) 8.6 - 10.3 mg/dL   Troponin I, High Sensitivity   Result Value Ref Range    Troponin I, High Sensitivity 19 (H) 0 - 13 ng/L     CT angio chest for pulmonary embolism    Result Date: 2/24/2025  Interpreted By:  Jodee Quiñonez, STUDY: CT ANGIO CHEST FOR PULMONARY EMBOLISM;  2/24/2025 12:35 pm   INDICATION: Signs/Symptoms:hypoxia, shortness of breath.     COMPARISON: CT abdomen pelvis 06/15/2021   ACCESSION  NUMBER(S): [id number]   ORDERING CLINICIAN: KYLER AREVALO   TECHNIQUE: Helical data acquisition of the chest was obtained after intravenous administration of 75 ML Omnipaque 350, as per PE protocol. Images were reformatted in coronal and sagittal planes. Axial and coronal maximum intensity projection (MIP) images were created and reviewed.   FINDINGS: LUNGS AND AIRWAYS: The trachea and central airways are patent. No endobronchial lesion. Small bilateral pleural effusions with atelectasis/infiltrate, left-greater-than-right. Bilateral smooth interlobular septal thickening and mosaic attenuation of the bilateral lungs, compatible with interstitial edema. A 7 mm nodule seen in the right middle lobe on series 407 image 176.   MEDIASTINUM AND KENYON, LOWER NECK AND AXILLA: The visualized thyroid gland is within normal limits.   No evidence of thoracic lymphadenopathy by CT criteria.   Esophagus appears within normal limits as seen.   HEART AND VESSELS: No discrete filling defects within the main pulmonary artery or its branches to segmental level. Please note that, assessment of subsegmental branches is limited and small peripheral emboli are not entirely excluded.   Main pulmonary artery and its branches are normal in caliber.   The thoracic aorta is of normal course and caliber with mild vascular calcifications.   No coronary artery calcifications are seen. The study is not optimized for evaluation of coronary arteries.   The cardiac chambers are not enlarged.   No evidence of pericardial effusion.   UPPER ABDOMEN: The visualized subdiaphragmatic structures demonstrate no remarkable findings.   CHEST WALL AND OSSEOUS STRUCTURES: There are no suspicious osseous lesions. Multilevel degenerative changes are present       1. No acute pulmonary embolism to the segmental level. 2. Bilateral interstitial edema. 3. Small bilateral pleural effusions with atelectasis/infiltrate, left-greater-than-right. 4. Right middle lobe 7  mm nodule, for which follow-up CT chest is recommended in 6 months. (Reference: Kun Jimenezhojennifer et al., Guidelines for management of incidental pulmonary nodules detected on CT images: From the Fleischner Society 2017, Radiology. 2017 Jul;284 (1):228-243.)   MACRO: None   Signed by: Jodee Quiñonez 2/24/2025 1:05 PM Dictation workstation:   [id number]    CT facial bones w IV contrast    Result Date: 2/24/2025  Interpreted By:  Paul Hdz, STUDY: CT FACIAL BONES W IV CONTRAST  2/24/2025 12:35 pm   INDICATION: Signs/Symptoms:facial swelling every morning x 1 week.  Now predominantly left periorbital swelling     COMPARISON: None.   ACCESSION NUMBER(S): [id number]   ORDERING CLINICIAN: KYLER AREVALO   TECHNIQUE: Intravenous contrast enhanced thin cut axial CT images through the facial bones were obtained and reconstructed in the coronal  and sagittal plane using 75 mL Omnipaque 350.   FINDINGS: The orbital preseptal soft tissues have mild edema/inflammation more so on the left extending lateral to the orbits again more so on the left. No localized fluid collection is noted.   Lens replacements have been performed bilaterally. No intraorbital inflammatory processes or masses are noted.   The orbital structures enhance normally.   No intracranial mass, infarct or hemorrhage is noted.   The left maxillary sinus has mild mucosal thickening inferiorly. The upper left middle molar tooth has periapical lucencies extending to the floor of the left maxillary sinus. The left mastoid air cells have small amounts of fluid.   The external auditory canals are relatively small in caliber and have small amounts of debris more so on the left.       The preseptal facial soft tissues have mild edema/inflammation more so on the left with no intra orbital mass or inflammation noted.   MACRO: None   Signed by: Paul Hdz 2/24/2025 12:49 PM Dictation workstation:   [id number]    XR chest 1 view    Result Date: 2/24/2025  Interpreted  By:  Jodee Quiñonez, STUDY: XR CHEST 1 VIEW;  2/24/2025 11:54 am   INDICATION: Signs/Symptoms:shortness of breath, hypoxia.   COMPARISON: 05/14/2021   ACCESSION NUMBER(S): [id number]   ORDERING CLINICIAN: KYLER AREVALO   FINDINGS: AP radiograph of the chest was provided.       CARDIOMEDIASTINAL SILHOUETTE: Cardiomediastinal silhouette is normal in size and configuration.   LUNGS: New retrocardiac and left basilar opacification. The right lung is clear. Coarse interstitial lung markings are noted. No pneumothorax.   ABDOMEN: No remarkable upper abdominal findings.   BONES: No acute osseous changes.       New retrocardiac and left basilar opacification, which could relate to underlying pleural effusion with superimposed infiltrate. Follow-up chest radiograph in 8-12 weeks after treatment or further evaluation with CT chest is recommended to exclude underlying neoplasm.   MACRO: Critical Finding:  See findings. Notification was initiated on 2/24/2025 at 12:12 pm by  Jodee Quiñonez.  (**-YCF-**) Instructions:   Signed by: Jodee Quiñonez 2/24/2025 12:12 PM Dictation workstation:   [id number]        Assessment/Plan   Assessment & Plan  Symptom of congestive heart failure    Acute congestive heart failure, unspecified heart failure type    Eczema    Elevated brain natriuretic peptide (BNP) level    Elevated troponin    Hypoxia    Acute cystitis without hematuria    Iron deficiency anemia      #Elevated BNP  #Elevated troponin  #Hypoxia, resolved   - Spouse reports worsening shortness of breath with activity and periorbital edema, worse in the morning and improves throughout the day   - Patient required 2L O2 in the ED, on room air today   -    - Trop 23 > 19   - CTA chest: No acute pulmonary embolism to the segmental level. 2. Bilateral interstitial edema. 3. Small bilateral pleural effusions with atelectasis/infiltrate,   left-greater-than-right.   - Echo pending; no previous found   - Trial Lasix 20 mg IV x 1  dose today and monitor output   - Strict I&Os  - Daily weights  - 1800 mL daily fluid restriction, 2g Na diet  - Continuous telemetry monitoring   - Cardiology consulted, appreciate recs     #UTI  - UA: trace ketones, 75 leuk esterase  - Start ceftriaxone (day 1)  - Adjust ATB pending final culture results    #Eczema   - Present on admission   - Patient brought in Eucerin cream and Aquaphor that she uses at home  - Start fluocinonide 0.05% cream BID x 5 days  - Start prednisone 40 mg PO every day x 5 days  - Benadryl 25 mg q6h PRN for itching  - Refer to derm on discharge    #Anemia, iron deficiency    - H/H 7.9/27.4 > 7.5/28.2   - MCV 72 > 77  - Has seen hem/onc in the past; last seen in September 2024 (PAYAL Arnold CNP)   - Iron studies: iron 17, 5% saturation, ferritin 16  - Occult stool pending   - Patient has received Venofer infusions in the past but had difficulty tolerating; had worsening itchiness/rash per hem/onc notes  - Start ferrous sulfate every day   - Hold Lovenox   - Monitor for active bleeding  - Daily CBC   - follow up with hem/onc on discharge- refer to Dr. Pedraza to re-establish care     DVT PPx: SCDs   GI PPx: Protonix   Diet: 2g Na, 1800 mL FR   Code Status: Full     Disposition: Patient requires inpatient management at this time.         Sheree Benavides PA-C

## 2025-02-25 NOTE — PROGRESS NOTES
"   02/25/25 4689   Discharge Planning   Living Arrangements Spouse/significant other   Support Systems Spouse/significant other;Children;Family members   Assistance Needed Met with patient and her , Jace, at bedside this morning. Nicole is noted to have some memory issues, not remembering why she is there or how long she has been there. Jace states the memory issues come and go. Patient is independent with ADL's. Jace states she uses a cane and she is slow going when walking. States she had injections in her left knee that helped her to move better and needs a shot again. She has access to a walker at home but does not like to use it. Jace does the driving and grocery shopping. Jace states they are starting to have a difficult time affording food, they get $50 a month from [employer] for groceries, and \"that doesn't go far\". [facility] discussed [facility] program and flier givn to Jace. Jace states they both are scheduled to see Addis Cardona in May to establish care with her since their PCP moved. Jace would like to take her home upon discharge.   Type of Residence Private residence   Number of Stairs to Enter Residence 3   Number of Stairs Within Residence 12  (basement, patient does not use)   Who is requesting discharge planning? Provider   Home or Post Acute Services None   Expected Discharge Disposition Home   Does the patient need discharge transport arranged? No  ( to pick him up)   Financial Resource Strain   How hard is it for you to pay for the very basics like food, housing, medical care, and heating? Hard   Housing Stability   In the last 12 months, was there a time when you were not able to pay the mortgage or rent on time? N   In the past 12 months, how many times have you moved where you were living? 0   At any time in the past 12 months, were you homeless or living in a shelter (including now)? N   Transportation Needs   In the past 12 months, has lack of transportation kept you from medical " appointments or from getting medications? no   In the past 12 months, has lack of transportation kept you from meetings, work, or from getting things needed for daily living? No   Stroke Family Assessment   Stroke Family Assessment Needed No   Intensity of Service   Intensity of Service 0-30 min

## 2025-02-25 NOTE — CARE PLAN
The patient's goals for the shift include      The clinical goals for the shift include Maintain O2 >92.  Maintain safety    Alert, forgetful.  Resting in bed.  Tolerating po intake well without c/o n/v.  Alarms on.  O2 sats maintained.  Encouraged to ring for assistance.   Severe eczema all over body with multiple scratches and intermittent bleeding when scratching.  Border foams placed over places she scratched open and are bleeding.  Safety maintained.

## 2025-02-25 NOTE — CARE PLAN
The patient's goals for the shift include      The clinical goals for the shift include will not have facial swelling    Goal met. Vss. No c/o pain. Patient observed to have a rash all over body. Patient states she has been dealing with this all her life but never has it been this itchy. Patient is scratching so much and hard the skin ids opening up. Received new orders for medicated lotions and prednisone. Patient also on an IV ATB for UTI. Patient resting in bed at this time with call light in reach. Spouse here visiting today.

## 2025-02-25 NOTE — CONSULTS
"    Cardiology Consult Note  Patient: Nicole Bobo  Unit/Bed: 227/227-A  YOB: 1940    Admitting Diagnosis: Shortness of breath [R06.02]  Acute pulmonary edema [J81.0]  Facial swelling [R22.0]  Hypoxia [R09.02]  Bilateral pleural effusion [J90]  Acute on chronic diastolic (congestive) heart failure [I50.33]  Eczema, unspecified type [L30.9]  Acute congestive heart failure, unspecified heart failure type [I50.9]  Symptom of congestive heart failure [R09.89]  Date:  2/24/2025  Hospital Day: 0  Attending: Cardiology consulting at the request of  Joao Pedraza MD  for CHF exacerbation     Chief Complaint   Patient presents with    Facial Swelling     Waking up with facial swelling x1 week. Gradually goes away throughout the day.        History of Present Illness:   Nicole Bobo is a 84 y.o. Female who presented via [facility] with c/o Swelling and SOB. She is a poor historian and unable to provide any information on why she is in the hospital. Only complains of itching in her BLE. Information obtained from chart review.      Per ED note she has been having facial swelling for the last week upon waking mostly in the eyes that improves throughout the day and SOB with exertion. She also as extensive eczema that has been an issue recently.     Past medical history significant for  DINH, microcytic anemia,  esophagitis, Chronic knee pain,  Eczema, chronic constipation, macular degeneration  Primary Cardiology outpatient: none     SUBJECTIVE:   Sitting up in bed applying lotion to her BLE that are covered in abrasions from itching. Per patient she \"scratches with her toenails in the night\". Denies SOB, CP, Palpitations. No acute distress.       ALLERGIES:   Allergies   Allergen Reactions    Animal Dander Unknown    Dog Dander Other      Home Medication:  Medications Prior to Admission   Medication Sig Dispense Refill Last Dose/Taking    vit A/vit C/vit E/zinc/copper (PRESERVISION AREDS ORAL) Take 1 tablet by " mouth 2 times a day.   2/23/2025 Evening    desoximetasone (Topicort) 0.25 % cream APPLY ONE APPLICATION TOPICALLY TO AFFECTED AREA DAILY       ibuprofen 200 mg tablet Take 3 tablets (600 mg) by mouth once daily as needed.   2/22/2025      PMHx:  Past Medical History:   Diagnosis Date    Anesthesia of skin 08/08/2017    Arm numbness    Other constipation 09/23/2021    Chronic constipation    Pain in left hip 06/08/2016    Hip pain, acute, left    Personal history of other diseases of the respiratory system 11/04/2014    History of sinusitis    Personal history of pneumonia (recurrent) 11/04/2014    History of pneumonia    Unspecified cataract 02/13/2019    Cataracts, bilateral       PSHx:  Past Surgical History:   Procedure Laterality Date    APPENDECTOMY  01/20/2014    Appendectomy    TONSILLECTOMY  01/20/2014    Tonsillectomy       SOCIAL Hx:  Social History     Socioeconomic History    Marital status:    Tobacco Use    Smoking status: Never    Smokeless tobacco: Never   Vaping Use    Vaping status: Never Used   Substance and Sexual Activity    Alcohol use: Never    Drug use: Never     Social Drivers of Health     Financial Resource Strain: Low Risk  (2/24/2025)    Overall Financial Resource Strain (CARDIA)     Difficulty of Paying Living Expenses: Not hard at all   Food Insecurity: No Food Insecurity (2/24/2025)    Hunger Vital Sign     Worried About Running Out of Food in the Last Year: Never true     Ran Out of Food in the Last Year: Never true   Transportation Needs: No Transportation Needs (2/24/2025)    PRAPARE - Transportation     Lack of Transportation (Medical): No     Lack of Transportation (Non-Medical): No   Physical Activity: Inactive (10/18/2023)    Exercise Vital Sign     Days of Exercise per Week: 0 days     Minutes of Exercise per Session: 0 min   Stress: No Stress Concern Present (9/3/2024)    [employer] - Occupational Stress Questionnaire     Feeling of  Stress : Not at all   Intimate Partner Violence: Not At Risk (2/24/2025)    Humiliation, Afraid, Rape, and Kick questionnaire     Fear of Current or Ex-Partner: No     Emotionally Abused: No     Physically Abused: No     Sexually Abused: No   Housing Stability: Low Risk  (2/24/2025)    Housing Stability Vital Sign     Unable to Pay for Housing in the Last Year: No     Number of Times Moved in the Last Year: 0     Homeless in the Last Year: No       FAMILY Hx:  Family History   Problem Relation Name Age of Onset    Heart failure Mother      Alcohol abuse Father      Diabetes Father      Diabetes Brother         REVIEW OF SYMPTOMS:   12 system review of symptoms is negative except as noted above          OBJECTIVE:     VITALS:   Visit Vitals  /54 (BP Location: Right arm, Patient Position: Lying)   Pulse 80   Temp 35.7 °C (96.3 °F) (Temporal)   Resp 16        Wt Readings from Last 5 Encounters:   02/25/25 63.2 kg (139 lb 5.3 oz)   09/10/24 67.6 kg (149 lb)   09/03/24 68.2 kg (150 lb 7.4 oz)   04/01/24 67.1 kg (148 lb)   03/04/24 67.4 kg (148 lb 7.7 oz)       INTAKE/ OUTPUT:   No intake/output data recorded.     TELEMETRY MONITORING: SR    PHYSICAL EXAMINATION:    Vitals reviewed.   Constitutional:       General: Awake.      Appearance: Normal appearance. Well-developed and not in distress.   Eyes:      General: Lids are normal.      Pupils: Pupils are equal, round, and reactive to light.   HENT:      Right Ear: External ear normal.      Left Ear: External ear normal.      Nose: Nose normal.    Mouth/Throat:      Lips: Pink.      Mouth: Mucous membranes are moist.   Neck:      Vascular: JVD normal.   Pulmonary:      Breath sounds: Normal air entry.   Cardiovascular:      PMI at left midclavicular line. Normal rate. Regular rhythm. Normal S1. Normal S2.       Murmurs: There is no murmur.      Comments: Trace BLE  Edema:     Peripheral edema present.  Abdominal:      General: Bowel sounds are normal.       Palpations: Abdomen is soft.      Tenderness: There is no abdominal tenderness.   Musculoskeletal: Normal range of motion.      Cervical back: Full passive range of motion without pain, normal range of motion and neck supple. Skin:     General: Skin is warm and dry.      Findings: Abrasion and erythema present.   Neurological:      General: No focal deficit present.      Mental Status: Alert and oriented to person, place and time. Mental status is at baseline. Confused.   Psychiatric:         Attention and Perception: Attention normal.         Mood and Affect: Mood normal.         Speech: Speech normal.         Behavior: Behavior is cooperative.          LABS:  CMP:   Recent Labs     02/25/25  0748 02/24/25  1050 08/30/24  0824 03/04/24  1017 01/05/24  0958 07/07/23  0843 08/17/22  0811 09/14/21  1432 05/25/21  1350 05/14/21  1251    144 141 143 143 142 143 140 141 140   K 3.7 3.7 3.9 4.1 4.4 4.8 4.3 4.0 4.0 4.5   * 109* 106 106 107 103 106 105 105 105   CO2 22 27 29 29 27 28 30 27 30 25   ANIONGAP 15 12 10 12 13 16 11 12 10 15   BUN 17 17 19 18 18 18 19 18 16 19   CREATININE 0.79 0.80 0.75 0.70 0.73 0.79 0.86 0.88 0.65 0.80   EGFR 74 73 79 86 82  --   --   --   --   --    MG  --  2.18  --   --   --   --   --   --   --   --      LIVER ENZYMES:   Recent Labs     02/24/25  1050 08/30/24  0824 03/04/24  1017 01/05/24  0958 07/07/23  0843 08/17/22  0811 09/14/21  1432 05/25/21  1350   ALBUMIN 3.7 3.5 3.9 4.0 4.1 4.0 4.0 3.8   ALT 10 12 14 28 12 18 14 14   AST 19 19 20 25 20 24 19 20   BILITOT 0.5 0.6 0.4 0.4 0.5 0.4 0.3 0.3     CBC:  Recent Labs     02/25/25  0748 02/24/25  1050 08/30/24  0824 03/04/24  1017 01/05/24  0958 10/04/23  0840 07/07/23  0843 03/23/23  0940   WBC 7.4 7.7 6.9 7.7 8.9 7.7 7.6 7.5   HGB 7.5* 7.9* 14.4 13.5 11.6* 10.1* 15.1 15.2   HCT 28.2* 27.4* 46.0 43.2 39.1 34.3* 49.1* 48.2*    581* 365 395 478* 537* 449 360   MCV 77* 72* 87 87 84 78* 90 90     COAG:   Recent Labs      "02/24/25  1050   INR 1.2*     ABO:   Recent Labs     05/15/21  1006   ABO B     HEME/ENDO:  Recent Labs     02/24/25  1050 08/30/24  0824 03/04/24  1017 01/05/24  0958 07/02/20  0905 07/02/19  0729   FERRITIN  --   --  114 20  22   < >  --    IRONSAT  --   --  26 6*  6*   < >  --    TSH 4.47* 2.65  --   --    < > 2.31   HGBA1C  --   --   --   --   --  5.5    < > = values in this interval not displayed.      CARDIAC:   Recent Labs     02/25/25  0748 02/24/25  1050 03/04/24  1017 05/25/21  1350   LDH  --   --  191 183   TROPHS 19* 23*  --   --    BNP  --  188*  --   --        Lipid Panel:  No results found for: \"HDL\", \"CHHDL\", \"VLDL\", \"TRIG\", \"NHDL\"       CURRENT MEDICATIONS:   Infusions:     Scheduled:  cefTRIAXone, 1 g, q24h  enoxaparin, 40 mg, q24h  influenza, 0.5 mL, During hospitalization  pantoprazole, 40 mg, Daily before breakfast   Or  pantoprazole, 40 mg, Daily before breakfast  perflutren lipid microspheres, 0.5-10 mL of dilution, Once in imaging  perflutren protein A microsphere, 0.5 mL, Once in imaging  pneumococcal polysaccharide, 0.5 mL, During hospitalization  polyethylene glycol, 17 g, Daily  sulfur hexafluoride microsphr, 2 mL, Once in imaging      PRN:  acetaminophen, 650 mg, q4h PRN   Or  acetaminophen, 650 mg, q4h PRN   Or  acetaminophen, 650 mg, q4h PRN  acetaminophen, 650 mg, q4h PRN   Or  acetaminophen, 650 mg, q4h PRN   Or  acetaminophen, 650 mg, q4h PRN  benzocaine-menthol, 1 lozenge, q2h PRN  dextromethorphan-guaifenesin, 5 mL, q4h PRN  diphenhydramine-zinc acetate, , TID PRN  guaiFENesin, 600 mg, q12h PRN  melatonin, 6 mg, Nightly PRN  ondansetron ODT, 4 mg, q8h PRN   Or  ondansetron, 4 mg, q8h PRN  oxygen, , Continuous PRN - O2/gases          LAST IMAGING RESULTS INDEPENDENTLY REVIEWED:   CT angio chest for pulmonary embolism  Narrative: Interpreted By:  Jodee Quiñonez,   STUDY:  CT ANGIO CHEST FOR PULMONARY EMBOLISM;  2/24/2025 12:35 pm      INDICATION:  Signs/Symptoms:hypoxia, shortness " of breath.          COMPARISON:  CT abdomen pelvis 06/15/2021      ACCESSION NUMBER(S):  [id number]      ORDERING CLINICIAN:  KYLER AREVALO      TECHNIQUE:  Helical data acquisition of the chest was obtained after intravenous  administration of 75 ML Omnipaque 350, as per PE protocol. Images  were reformatted in coronal and sagittal planes. Axial and coronal  maximum intensity projection (MIP) images were created and reviewed.      FINDINGS:  LUNGS AND AIRWAYS:  The trachea and central airways are patent. No endobronchial lesion.  Small bilateral pleural effusions with atelectasis/infiltrate,  left-greater-than-right. Bilateral smooth interlobular septal  thickening and mosaic attenuation of the bilateral lungs, compatible  with interstitial edema. A 7 mm nodule seen in the right middle lobe  on series 407 image 176.      MEDIASTINUM AND KENYON, LOWER NECK AND AXILLA:  The visualized thyroid gland is within normal limits.      No evidence of thoracic lymphadenopathy by CT criteria.      Esophagus appears within normal limits as seen.      HEART AND VESSELS:  No discrete filling defects within the main pulmonary artery or its  branches to segmental level. Please note that, assessment of  subsegmental branches is limited and small peripheral emboli are not  entirely excluded.      Main pulmonary artery and its branches are normal in caliber.      The thoracic aorta is of normal course and caliber with mild vascular  calcifications.      No coronary artery calcifications are seen. The study is not  optimized for evaluation of coronary arteries.      The cardiac chambers are not enlarged.      No evidence of pericardial effusion.      UPPER ABDOMEN:  The visualized subdiaphragmatic structures demonstrate no remarkable  findings.      CHEST WALL AND OSSEOUS STRUCTURES:  There are no suspicious osseous lesions. Multilevel degenerative  changes are present      Impression: 1. No acute pulmonary embolism to the segmental  level.  2. Bilateral interstitial edema.  3. Small bilateral pleural effusions with atelectasis/infiltrate,  left-greater-than-right.  4. Right middle lobe 7 mm nodule, for which follow-up CT chest is  recommended in 6 months. (Reference: Kun Echevarria et al.,  Guidelines for management of incidental pulmonary nodules detected on  CT images: From the Fleischner Society 2017, Radiology. 2017 Jul;284  (1):228-243.)      MACRO:  None      Signed by: Jodee Quiñonez 2/24/2025 1:05 PM  Dictation workstation:   [id number]  CT facial bones w IV contrast  Narrative: Interpreted By:  Paul Hdz,   STUDY:  CT FACIAL BONES W IV CONTRAST  2/24/2025 12:35 pm      INDICATION:  Signs/Symptoms:facial swelling every morning x 1 week.  Now  predominantly left periorbital swelling          COMPARISON:  None.      ACCESSION NUMBER(S):  [id number]      ORDERING CLINICIAN:  KYLER AREVALO      TECHNIQUE:  Intravenous contrast enhanced thin cut axial CT images through the  facial bones were obtained and reconstructed in the coronal  and  sagittal plane using 75 mL Omnipaque 350.      FINDINGS:  The orbital preseptal soft tissues have mild edema/inflammation more  so on the left extending lateral to the orbits again more so on the  left. No localized fluid collection is noted.      Lens replacements have been performed bilaterally. No intraorbital  inflammatory processes or masses are noted.      The orbital structures enhance normally.      No intracranial mass, infarct or hemorrhage is noted.      The left maxillary sinus has mild mucosal thickening inferiorly. The  upper left middle molar tooth has periapical lucencies extending to  the floor of the left maxillary sinus. The left mastoid air cells  have small amounts of fluid.      The external auditory canals are relatively small in caliber and have  small amounts of debris more so on the left.      Impression: The preseptal facial soft tissues have mild edema/inflammation  more  so on the left with no intra orbital mass or inflammation noted.      MACRO:  None      Signed by: Paul Hdz 2/24/2025 12:49 PM  Dictation workstation:   [id number]  XR chest 1 view  Narrative: Interpreted By:  Jodee Quiñonez,   STUDY:  XR CHEST 1 VIEW;  2/24/2025 11:54 am      INDICATION:  Signs/Symptoms:shortness of breath, hypoxia.      COMPARISON:  05/14/2021      ACCESSION NUMBER(S):  [id number]      ORDERING CLINICIAN:  KYLER AREVALO      FINDINGS:  AP radiograph of the chest was provided.              CARDIOMEDIASTINAL SILHOUETTE:  Cardiomediastinal silhouette is normal in size and configuration.      LUNGS:  New retrocardiac and left basilar opacification. The right lung is  clear. Coarse interstitial lung markings are noted. No pneumothorax.      ABDOMEN:  No remarkable upper abdominal findings.      BONES:  No acute osseous changes.      Impression: New retrocardiac and left basilar opacification, which could relate  to underlying pleural effusion with superimposed infiltrate.  Follow-up chest radiograph in 8-12 weeks after treatment or further  evaluation with CT chest is recommended to exclude underlying  neoplasm.      MACRO:  Critical Finding:  See findings. Notification was initiated on  2/24/2025 at 12:12 pm by  Jodee Quiñonez.  (**-YCF-**) Instructions:      Signed by: Jodee Quiñonez 2/24/2025 12:12 PM  Dictation workstation:   [id number]           CARDIOVASCULAR STUDIES:       EKG dated 2/25/2025 independently reviewed   NSR with non specific T wave abnormalities, unchanged from previous       Echocardiogram 2/25/2025--Pending     ASSESSMENT:   83 yo F admitted with intermittent facial swelling and progressive BURROUGHS concern for acute decompensated HF, unknown LVEF  Microcytic anemia, DINH Hgb 7.5, 5 months ago 14.4 sees heme/oncology    Chronic Pain  Eczema   Confused, ? Dementia    PLAN:    Does not appear grossly overload on examination, noted to have mild BLE edema and fine crackles  in bases, oxygenating on RA  Agree with Trial of Furosemide  2. Suspect SOB/BURROUGHS is multifaceted   3. Recommend addressing down trending Hgb  4. Further recommendation pending Echo results      Thank you  for the consult   Will follow   Please call or message with questions, concerns or changes in clinical condition   The case was discussed with  Sheree Benavides PA-C      Electronically signed by GRETA Raza on 2/25/2025 at 9:44 AM  I spent 60 minutes in the professional and overall care of this patient.

## 2025-02-25 NOTE — DISCHARGE INSTR - OTHER ORDERS
Thank you for choosing [facility] for your Health Care needs.  Also, thank you for allowing us to take you and your families preferences into account when determining your discharge plan.  Stay well!                                    Your Care Transitions Team Member:  Dianne Jordan Robin and Flores     For questions about your medications listed on your discharge instructions, please call the Nurses Station at [phone].

## 2025-02-26 ENCOUNTER — TELEPHONE (OUTPATIENT)
Dept: PRIMARY CARE | Facility: CLINIC | Age: 85
End: 2025-02-26
Payer: MEDICARE

## 2025-02-26 DIAGNOSIS — R91.1 PULMONARY NODULE: Primary | ICD-10-CM

## 2025-02-26 LAB
ANION GAP SERPL CALC-SCNC: 9 MMOL/L (ref 10–20)
AORTIC VALVE PEAK VELOCITY: 1.93 M/S
AV PEAK GRADIENT: 15 MMHG
AVA (PEAK VEL): 1.6 CM2
BACTERIA UR CULT: NORMAL
BUN SERPL-MCNC: 19 MG/DL (ref 6–23)
CALCIUM SERPL-MCNC: 9.1 MG/DL (ref 8.6–10.3)
CHLORIDE SERPL-SCNC: 110 MMOL/L (ref 98–107)
CO2 SERPL-SCNC: 29 MMOL/L (ref 21–32)
CREAT SERPL-MCNC: 0.77 MG/DL (ref 0.5–1.05)
EGFRCR SERPLBLD CKD-EPI 2021: 76 ML/MIN/1.73M*2
EJECTION FRACTION APICAL 4 CHAMBER: 58.4
EJECTION FRACTION: 63 %
ERYTHROCYTE [DISTWIDTH] IN BLOOD BY AUTOMATED COUNT: 19.2 % (ref 11.5–14.5)
GLUCOSE SERPL-MCNC: 96 MG/DL (ref 74–99)
HCT VFR BLD AUTO: 24.3 % (ref 36–46)
HCT VFR BLD AUTO: 25.7 % (ref 36–46)
HEMOCCULT SP1 STL QL: POSITIVE
HGB BLD-MCNC: 6.9 G/DL (ref 12–16)
HGB BLD-MCNC: 7.3 G/DL (ref 12–16)
LEFT ATRIUM VOLUME AREA LENGTH INDEX BSA: 33.5 ML/M2
LEFT VENTRICLE INTERNAL DIMENSION DIASTOLE: 4.05 CM (ref 3.5–6)
LEFT VENTRICULAR OUTFLOW TRACT DIAMETER: 1.9 CM
MCH RBC QN AUTO: 20.1 PG (ref 26–34)
MCHC RBC AUTO-ENTMCNC: 28.4 G/DL (ref 32–36)
MCV RBC AUTO: 71 FL (ref 80–100)
MITRAL VALVE E/A RATIO: 1.16
NRBC BLD-RTO: 0.3 /100 WBCS (ref 0–0)
PLATELET # BLD AUTO: 470 X10*3/UL (ref 150–450)
POTASSIUM SERPL-SCNC: 3.4 MMOL/L (ref 3.5–5.3)
RBC # BLD AUTO: 3.43 X10*6/UL (ref 4–5.2)
RIGHT VENTRICLE FREE WALL PEAK S': 15.2 CM/S
RIGHT VENTRICLE PEAK SYSTOLIC PRESSURE: 36.5 MMHG
SODIUM SERPL-SCNC: 145 MMOL/L (ref 136–145)
TRICUSPID ANNULAR PLANE SYSTOLIC EXCURSION: 1.9 CM
WBC # BLD AUTO: 7.1 X10*3/UL (ref 4.4–11.3)

## 2025-02-26 PROCEDURE — 2500000001 HC RX 250 WO HCPCS SELF ADMINISTERED DRUGS (ALT 637 FOR MEDICARE OP): Mod: IPSPLIT | Performed by: NURSE PRACTITIONER

## 2025-02-26 PROCEDURE — 85014 HEMATOCRIT: CPT | Mod: IPSPLIT | Performed by: NURSE PRACTITIONER

## 2025-02-26 PROCEDURE — 82374 ASSAY BLOOD CARBON DIOXIDE: CPT | Mod: IPSPLIT

## 2025-02-26 PROCEDURE — 36415 COLL VENOUS BLD VENIPUNCTURE: CPT | Mod: IPSPLIT | Performed by: NURSE PRACTITIONER

## 2025-02-26 PROCEDURE — 2500000001 HC RX 250 WO HCPCS SELF ADMINISTERED DRUGS (ALT 637 FOR MEDICARE OP): Mod: IPSPLIT

## 2025-02-26 PROCEDURE — 2500000004 HC RX 250 GENERAL PHARMACY W/ HCPCS (ALT 636 FOR OP/ED): Mod: IPSPLIT | Performed by: NURSE PRACTITIONER

## 2025-02-26 PROCEDURE — 99232 SBSQ HOSP IP/OBS MODERATE 35: CPT | Performed by: NURSE PRACTITIONER

## 2025-02-26 PROCEDURE — 94760 N-INVAS EAR/PLS OXIMETRY 1: CPT | Mod: IPSPLIT

## 2025-02-26 PROCEDURE — 82270 OCCULT BLOOD FECES: CPT | Mod: IPSPLIT

## 2025-02-26 PROCEDURE — 85027 COMPLETE CBC AUTOMATED: CPT | Mod: IPSPLIT

## 2025-02-26 PROCEDURE — 2500000004 HC RX 250 GENERAL PHARMACY W/ HCPCS (ALT 636 FOR OP/ED): Mod: IPSPLIT

## 2025-02-26 PROCEDURE — 1100000001 HC PRIVATE ROOM DAILY: Mod: IPSPLIT

## 2025-02-26 PROCEDURE — 99233 SBSQ HOSP IP/OBS HIGH 50: CPT | Performed by: NURSE PRACTITIONER

## 2025-02-26 PROCEDURE — 2500000005 HC RX 250 GENERAL PHARMACY W/O HCPCS: Mod: IPSPLIT

## 2025-02-26 RX ORDER — DOCUSATE SODIUM 100 MG/1
100 CAPSULE, LIQUID FILLED ORAL 2 TIMES DAILY
Status: DISCONTINUED | OUTPATIENT
Start: 2025-02-26 | End: 2025-03-01 | Stop reason: HOSPADM

## 2025-02-26 RX ORDER — AMOXICILLIN 250 MG
1 CAPSULE ORAL NIGHTLY
Status: DISCONTINUED | OUTPATIENT
Start: 2025-02-26 | End: 2025-03-01 | Stop reason: HOSPADM

## 2025-02-26 RX ORDER — FUROSEMIDE 20 MG/1
20 TABLET ORAL DAILY
Status: DISCONTINUED | OUTPATIENT
Start: 2025-02-26 | End: 2025-03-01 | Stop reason: HOSPADM

## 2025-02-26 RX ADMIN — POLYETHYLENE GLYCOL 3350 17 G: 17 POWDER, FOR SOLUTION ORAL at 09:16

## 2025-02-26 RX ADMIN — PREDNISONE 40 MG: 20 TABLET ORAL at 09:16

## 2025-02-26 RX ADMIN — FUROSEMIDE 20 MG: 20 TABLET ORAL at 12:40

## 2025-02-26 RX ADMIN — FLUOCINONIDE: 0.5 CREAM TOPICAL at 09:31

## 2025-02-26 RX ADMIN — DOCUSATE SODIUM 100 MG: 100 CAPSULE, LIQUID FILLED ORAL at 20:58

## 2025-02-26 RX ADMIN — CEFTRIAXONE 1 G: 1 INJECTION, SOLUTION INTRAVENOUS at 09:28

## 2025-02-26 RX ADMIN — IRON SUCROSE 200 MG: 20 INJECTION, SOLUTION INTRAVENOUS at 12:39

## 2025-02-26 RX ADMIN — Medication 6 MG: at 20:58

## 2025-02-26 RX ADMIN — FLUOCINONIDE: 0.5 CREAM TOPICAL at 21:01

## 2025-02-26 RX ADMIN — SENNOSIDES AND DOCUSATE SODIUM 1 TABLET: 50; 8.6 TABLET ORAL at 20:58

## 2025-02-26 RX ADMIN — FERROUS SULFATE TAB 325 MG (65 MG ELEMENTAL FE) 325 MG: 325 (65 FE) TAB at 09:16

## 2025-02-26 RX ADMIN — DOCUSATE SODIUM 100 MG: 100 CAPSULE, LIQUID FILLED ORAL at 12:52

## 2025-02-26 RX ADMIN — DIPHENHYDRAMINE HYDROCHLORIDE 25 MG: 25 CAPSULE ORAL at 20:59

## 2025-02-26 RX ADMIN — DIPHENHYDRAMINE HYDROCHLORIDE, ZINC ACETATE: 2; .1 CREAM TOPICAL at 12:40

## 2025-02-26 ASSESSMENT — COGNITIVE AND FUNCTIONAL STATUS - GENERAL
HELP NEEDED FOR BATHING: A LITTLE
TURNING FROM BACK TO SIDE WHILE IN FLAT BAD: A LITTLE
MOVING TO AND FROM BED TO CHAIR: A LITTLE
EATING MEALS: A LITTLE
DRESSING REGULAR LOWER BODY CLOTHING: A LITTLE
DRESSING REGULAR UPPER BODY CLOTHING: A LITTLE
MOBILITY SCORE: 18
PERSONAL GROOMING: A LITTLE
STANDING UP FROM CHAIR USING ARMS: A LITTLE
CLIMB 3 TO 5 STEPS WITH RAILING: A LOT
WALKING IN HOSPITAL ROOM: A LITTLE
DAILY ACTIVITIY SCORE: 18
TOILETING: A LITTLE

## 2025-02-26 ASSESSMENT — PAIN SCALES - GENERAL
PAINLEVEL_OUTOF10: 0 - NO PAIN
PAINLEVEL_OUTOF10: 0 - NO PAIN

## 2025-02-26 ASSESSMENT — PAIN - FUNCTIONAL ASSESSMENT: PAIN_FUNCTIONAL_ASSESSMENT: 0-10

## 2025-02-26 NOTE — CARE PLAN
The patient's goals for the shift include      The clinical goals for the shift include pt will have decreased facial swelling this shift    Over the shift, the patient did make progress toward the following goals.     Problem: Pain - Adult  Goal: Verbalizes/displays adequate comfort level or baseline comfort level  Outcome: Progressing     Problem: Safety - Adult  Goal: Free from fall injury  Outcome: Progressing     Problem: Skin  Goal: Prevent/manage excess moisture  Outcome: Progressing

## 2025-02-26 NOTE — NURSING NOTE
0730 Resumed care of patient, patient lying in bed, no needs at this time, call bell within reach.     1300 patient sitting in bed, no pain and no needs at this time, call ball within reach.

## 2025-02-26 NOTE — PROGRESS NOTES
Nicole Bobo is a 84 y.o. female on day 1 of admission presenting with Symptom of congestive heart failure.    Subjective   I don't know  I don't feel any better        is primary caretaker and takes care of patient.    Patient is fair historian.     Patient ambulating back from bathroom. Has unsteady gait and uses cane. Is hunched over due to kyphosis. Reports has been altered and sob since admission to the ED.    He is not sure about cough but has had leg edema and cough. Has chronic venous statis disease with leg swelling.     Hemoglobin 6.9 and repeat 7.3  Discussed transfusions, iron infusions and oral iron. Likely need to take iron twice a day. Very constipating even the iv iron.     Updated  regarding conditions. Will give another low dose iv iron. Did require 3 liters at hs. ? If underlying copd. Hx of smoking, quit some years ago.  still smokes.   02 last night 85 to 88% on 3 liters of 02.     Objective     Physical Exam  Vitals and nursing note reviewed.   Constitutional:       Appearance: Normal appearance.   HENT:      Head: Normocephalic and atraumatic.      Right Ear: Tympanic membrane normal.      Left Ear: Tympanic membrane normal.      Nose: Nose normal.      Mouth/Throat:      Mouth: Mucous membranes are moist.   Eyes:      Extraocular Movements: Extraocular movements intact.      Pupils: Pupils are equal, round, and reactive to light.   Cardiovascular:      Rate and Rhythm: Normal rate and regular rhythm.      Pulses: Normal pulses.      Heart sounds: Normal heart sounds.   Pulmonary:      Effort: Respiratory distress present.      Breath sounds: Wheezing and rales present.      Comments: Openly sob with ambulation from bathroom  Abdominal:      General: Bowel sounds are normal.      Palpations: Abdomen is soft.   Musculoskeletal:         General: Normal range of motion.      Cervical back: Normal range of motion and neck supple.   Skin:     General: Skin is warm.       "Capillary Refill: Capillary refill takes 2 to 3 seconds.      Findings: Erythema, lesion and rash present.      Comments: Has rash to legs back and arms   Neurological:      Mental Status: She is alert. Mental status is at baseline.   Psychiatric:         Mood and Affect: Mood normal.         Last Recorded Vitals  Blood pressure 149/77, pulse 79, temperature 36.8 °C (98.2 °F), temperature source Temporal, resp. rate 18, height 1.6 m (5' 3\"), weight 63.2 kg (139 lb 5.3 oz), SpO2 96%.  Intake/Output last 3 Shifts:  I/O last 3 completed shifts:  In: 690 (10.9 mL/kg) [P.O.:640; IV Piggyback:50]  Out: - (0 mL/kg)   Weight: 63.2 kg     Relevant Results                              Assessment/Plan   Assessment & Plan  Symptom of congestive heart failure    Acute congestive heart failure, unspecified heart failure type    Eczema    Elevated brain natriuretic peptide (BNP) level    Elevated troponin    Hypoxia    Acute cystitis without hematuria    Iron deficiency anemia    #Elevated BNP  #Elevated troponin  #Hypoxia, resolved   - Spouse reports worsening shortness of breath with activity and periorbital edema, worse in the morning and improves throughout the day   - Patient required 2L O2 in the ED, on room air today and requires 2 to 3 liters last night with 02 sat 85 to 88%  Hx of smoking in the past,  still smokes;  will need overnight trend as well as possible eval for copd  - CTA chest: No acute pulmonary embolism to the segmental level. 2. Bilateral interstitial edema. 3. Small bilateral pleural effusions with atelectasis/infiltrate,   left-greater-than-right.   - Echo pending; no previous found   - Trial Lasix 20 mg IV x 1 dose yesterday and repeat today. Did have good results. and monitor output   - Strict I&Os  - Daily weights  - 1800 mL daily fluid restriction, 2g Na diet  - Continuous telemetry monitoring   - Cardiology consulted  - lung nodule, hx of smoking, referral to [facility] for outpatient followup. "      #UTI  - UA: trace ketones, 75 leuk esterase  - Start ceftriaxone (day 2)  - Adjust ATB pending final culture results     #Eczema   - Present on admission   - Patient brought in Eucerin cream and Aquaphor that she uses at home  -fluocinonide 0.05% cream BID x 5 days  - prednisone 40 mg PO every day x 5 days  - Benadryl 25 mg q6h PRN for itching  - Refer to derm on discharge     #Anemia, iron deficiency    - H/H 7.9/27.4 > 7.5/28.2   - - repeat 6.9 and repeat 7.3  - Has seen hem/onc in the past; last seen in September 2024 (PAYAL Arnold CNP)   - Iron studies: iron 17, 5% saturation, ferritin 16  - Occult stool pending   - Patient has received Venofer infusions in the past but had difficulty tolerating; had worsening itchiness/rash per hem/onc notes. Pt willing to try today.   - ferrous sulfate every day, increase to bid at discharge   - Hold Lovenox   - Monitor for active bleeding  - Daily CBC   - follow up with hem/onc on discharge- refer to Dr. Pedraza to re-establish care   - needs bowel regimen and colace and senna at hs initiated.      DVT PPx: SCDs   GI PPx: Protonix   Diet: 2g Na, 1800 mL FR   Code Status: Full      Disposition: Patient requires inpatient management at this time.              FLORA Lyles-CNP

## 2025-02-26 NOTE — CARE PLAN
The patient's goals for the shift include  to stop itching     The clinical goals for the shift include pt will have decreased swelling throughut the shift    Problem: Pain - Adult  Goal: Verbalizes/displays adequate comfort level or baseline comfort level  Outcome: Progressing     Problem: Safety - Adult  Goal: Free from fall injury  Outcome: Progressing     Problem: Discharge Planning  Goal: Discharge to home or other facility with appropriate resources  Outcome: Progressing     Problem: Chronic Conditions and Co-morbidities  Goal: Patient's chronic conditions and co-morbidity symptoms are monitored and maintained or improved  Outcome: Progressing     Problem: Nutrition  Goal: Nutrient intake appropriate for maintaining nutritional needs  Outcome: Progressing     Problem: Skin  Goal: Decreased wound size/increased tissue granulation at next dressing change  Outcome: Progressing  Goal: Participates in plan/prevention/treatment measures  Outcome: Progressing  Goal: Prevent/manage excess moisture  Outcome: Progressing  Goal: Prevent/minimize sheer/friction injuries  Outcome: Progressing  Goal: Promote/optimize nutrition  Outcome: Progressing  Goal: Promote skin healing  Outcome: Progressing     Problem: Heart Failure  Goal: Improved gas exchange this shift  Outcome: Progressing  Goal: Improved urinary output this shift  Outcome: Progressing  Goal: Reduction in peripheral edema within 24 hours  Outcome: Progressing  Goal: Report improvement of dyspnea/breathlessness this shift  Outcome: Progressing  Goal: Weight from fluid excess reduced over 2-3 days, then stabilize  Outcome: Progressing  Goal: Increase self care and/or family involvement in 24 hours  Outcome: Progressing  0830: pt's   was very angry this morning because pt told him that no one has been in to see here since she has been here. Pt was demanding to see the doctor. I had notified the practitioner and she made her way in there when she was able.      Pt seems to be very confused. She didn't seem to know the time or where she was most of the day. She is very pleasant but confused/ very forgetful.

## 2025-02-26 NOTE — PROGRESS NOTES
Cardiology Progress Note  Patient: Nicole Bobo  Unit/Bed: 227/227-A  YOB: 1940    Admitting Diagnosis: Shortness of breath [R06.02]  Acute pulmonary edema [J81.0]  Facial swelling [R22.0]  Hypoxia [R09.02]  Bilateral pleural effusion [J90]  Acute on chronic diastolic (congestive) heart failure [I50.33]  Eczema, unspecified type [L30.9]  Acute congestive heart failure, unspecified heart failure type [I50.9]  Symptom of congestive heart failure [R09.89]  Date:  2/24/2025  Attending: Joao Pedraza MD      Hospital Day: 1    SUBJECTIVE:   Up in the room, denies chest pain or shortness of breath.  Admits issues with her memory   Main c/o itchy skin with very dry skin,  scratching until she bleeds            OBJECTIVE  Vitals:   Visit Vitals  /77 (BP Location: Left arm, Patient Position: Lying)   Pulse 79   Temp 36.8 °C (98.2 °F) (Temporal)   Resp 18        Wt Readings from Last 5 Encounters:   02/25/25 63.2 kg (139 lb 5.3 oz)   09/10/24 67.6 kg (149 lb)   09/03/24 68.2 kg (150 lb 7.4 oz)   04/01/24 67.1 kg (148 lb)   03/04/24 67.4 kg (148 lb 7.7 oz)       INTAKE/ OUTPUT:   I/O last 3 completed shifts:  In: 690 (10.9 mL/kg) [P.O.:640; IV Piggyback:50]  Out: - (0 mL/kg)   Weight: 63.2 kg      TELEMETRY MONITORING : SR    PHYSICAL EXAMINATION:    Constitutional:       Appearance: Healthy appearance. Not in distress.   Eyes:      Conjunctiva/sclera: Conjunctivae normal.   Neck:      Vascular: JVD normal.   Pulmonary:      Effort: Pulmonary effort is normal.      Breath sounds: Normal breath sounds.   Cardiovascular:      PMI at left midclavicular line. Normal rate. Regular rhythm. Normal S1. Normal S2.       Murmurs: There is no murmur.      No rub.   Pulses:     Intact distal pulses.   Edema:     Peripheral edema absent.   Abdominal:      General: Bowel sounds are normal.   Skin:     General: Skin is dry.      Findings: Rash present.   Neurological:      Mental Status: Alert and oriented to  person, place and time.            LABS:  CMP:   Recent Labs     02/26/25  0648 02/25/25  0748 02/24/25  1050 08/30/24  0824 03/04/24  1017 01/05/24  0958 07/07/23  0843 08/17/22  0811 09/14/21  1432 05/25/21  1350    143 144 141 143 143 142 143 140 141   K 3.4* 3.7 3.7 3.9 4.1 4.4 4.8 4.3 4.0 4.0   * 110* 109* 106 106 107 103 106 105 105   CO2 29 22 27 29 29 27 28 30 27 30   ANIONGAP 9* 15 12 10 12 13 16 11 12 10   BUN 19 17 17 19 18 18 18 19 18 16   CREATININE 0.77 0.79 0.80 0.75 0.70 0.73 0.79 0.86 0.88 0.65   EGFR 76 74 73 79 86 82  --   --   --   --    MG  --   --  2.18  --   --   --   --   --   --   --      LIVER ENZYMES:   Recent Labs     02/24/25  1050 08/30/24  0824 03/04/24  1017 01/05/24  0958 07/07/23  0843 08/17/22  0811 09/14/21  1432 05/25/21  1350   ALBUMIN 3.7 3.5 3.9 4.0 4.1 4.0 4.0 3.8   ALT 10 12 14 28 12 18 14 14   AST 19 19 20 25 20 24 19 20   BILITOT 0.5 0.6 0.4 0.4 0.5 0.4 0.3 0.3     CBC:  Recent Labs     02/26/25  1042 02/26/25  0751 02/25/25  0748 02/24/25  1050 08/30/24  0824 03/04/24  1017 01/05/24  0958 10/04/23  0840 07/07/23  0843   WBC  --  7.1 7.4 7.7 6.9 7.7 8.9 7.7 7.6   HGB 7.3* 6.9* 7.5* 7.9* 14.4 13.5 11.6* 10.1* 15.1   HCT 25.7* 24.3* 28.2* 27.4* 46.0 43.2 39.1 34.3* 49.1*   PLT  --  470* 262 581* 365 395 478* 537* 449   MCV  --  71* 77* 72* 87 87 84 78* 90     COAG:   Recent Labs     02/24/25  1050   INR 1.2*     ABO:   Recent Labs     05/15/21  1006   ABO B     HEME/ENDO:  Recent Labs     02/25/25  0748 02/24/25  1050 08/30/24  0824 03/04/24  1017 07/02/20  0905 07/02/19  0729   FERRITIN 16  --   --  114   < >  --    IRONSAT 5*  --   --  26   < >  --    TSH  --  4.47* 2.65  --    < > 2.31   HGBA1C  --   --   --   --   --  5.5    < > = values in this interval not displayed.      CARDIAC:   Recent Labs     02/25/25  0748 02/24/25  1050 03/04/24  1017 05/25/21  1350   LDH  --   --  191 183   TROPHS 19* 23*  --   --    BNP  --  188*  --   --        Lipid Panel:  No  "results found for: \"HDL\", \"CHHDL\", \"VLDL\", \"TRIG\", \"NHDL\"        CURRENT MEDICATIONS:   Infusions:     Scheduled:  cefTRIAXone, 1 g, q24h  [Held by provider] enoxaparin, 40 mg, q24h  ferrous sulfate, 65 mg of iron, Daily with breakfast  influenza, 0.5 mL, During hospitalization  fluocinonide, , BID  pantoprazole, 40 mg, Daily before breakfast   Or  pantoprazole, 40 mg, Daily before breakfast  perflutren lipid microspheres, 0.5-10 mL of dilution, Once in imaging  perflutren protein A microsphere, 0.5 mL, Once in imaging  pneumococcal polysaccharide, 0.5 mL, During hospitalization  polyethylene glycol, 17 g, Daily  predniSONE, 40 mg, Daily  sulfur hexafluoride microsphr, 2 mL, Once in imaging      PRN:  acetaminophen, 650 mg, q4h PRN   Or  acetaminophen, 650 mg, q4h PRN   Or  acetaminophen, 650 mg, q4h PRN  acetaminophen, 650 mg, q4h PRN   Or  acetaminophen, 650 mg, q4h PRN   Or  acetaminophen, 650 mg, q4h PRN  benzocaine-menthol, 1 lozenge, q2h PRN  dextromethorphan-guaifenesin, 5 mL, q4h PRN  diphenhydrAMINE, 25 mg, q6h PRN  diphenhydramine-zinc acetate, , TID PRN  guaiFENesin, 600 mg, q12h PRN  melatonin, 6 mg, Nightly PRN  ondansetron ODT, 4 mg, q8h PRN   Or  ondansetron, 4 mg, q8h PRN  oxygen, , Continuous PRN - O2/gases              IMAGING REPORTS INDEPENDENTLY REVIEWED:     CT angio chest for PE 2/24/2025  IMPRESSION:  1. No acute pulmonary embolism to the segmental level.  2. Bilateral interstitial edema.  3. Small bilateral pleural effusions with atelectasis/infiltrate,  left-greater-than-right.  4. Right middle lobe 7 mm nodule, for which follow-up CT chest is  recommended in 6 months.         CARDIOVASCULAR STUDIES:         EKG dated 2/25/2025 independently reviewed   NSR with non specific T wave abnormalities, unchanged from previous         Echocardiogram 2/25/2025  CONCLUSIONS:   1. Left ventricular ejection fraction is normal, by visual estimate at 60-65%.   2. Spectral Doppler shows an abnormal " pattern of left ventricular diastolic filling.   3. There is normal right ventricular global systolic function.   4. The left atrium is mildly dilated.        ASSESSMENT:      84 YOF with new onset,  acute HFpEF   Bilateral pleural effusion R > L.  Dyspnea is multifactorial in the setting of anemia   UTI   Chronic microcytic iron deficiency anemia   Chronic Pain  Eczema   Delirium vs dementia          PLAN:   Lasix 20 mg daily   Add spironolactone 25 mg daily, adding GDMT as BP allows   Dietician referral   Daily weight   Accurate I/O    Closely monitor renal function / electrolyte balance           Thank you  for the consult   Will follow peripherally   Please call or message with questions, concerns or changes in clinical condition   The case was discussed with  GRETA Valdes        Electronically signed by GRETA Eaton on 2/26/2025 at 11:25 AM

## 2025-02-26 NOTE — PROGRESS NOTES
02/26/25 1213   Discharge Planning   Assistance Needed Met with patient and  at bedside. He stated he is comforable taking her home with no additional services when she is medically ready. We discussed  [facility] program. Jace stated he had already received the information. No other needs identified at this time. DC Plan: Home.   Expected Discharge Disposition Home   Does the patient need discharge transport arranged? No  ( will pick her up.)   Intensity of Service   Intensity of Service 0-30 min

## 2025-02-27 LAB
ANION GAP SERPL CALC-SCNC: 12 MMOL/L (ref 10–20)
BUN SERPL-MCNC: 23 MG/DL (ref 6–23)
CALCIUM SERPL-MCNC: 8.9 MG/DL (ref 8.6–10.3)
CHLORIDE SERPL-SCNC: 103 MMOL/L (ref 98–107)
CO2 SERPL-SCNC: 30 MMOL/L (ref 21–32)
CREAT SERPL-MCNC: 0.89 MG/DL (ref 0.5–1.05)
EGFRCR SERPLBLD CKD-EPI 2021: 64 ML/MIN/1.73M*2
ERYTHROCYTE [DISTWIDTH] IN BLOOD BY AUTOMATED COUNT: 19.3 % (ref 11.5–14.5)
GLUCOSE SERPL-MCNC: 88 MG/DL (ref 74–99)
HCT VFR BLD AUTO: 26.3 % (ref 36–46)
HGB BLD-MCNC: 7.5 G/DL (ref 12–16)
MCH RBC QN AUTO: 20.5 PG (ref 26–34)
MCHC RBC AUTO-ENTMCNC: 28.5 G/DL (ref 32–36)
MCV RBC AUTO: 72 FL (ref 80–100)
NRBC BLD-RTO: 1.4 /100 WBCS (ref 0–0)
PLATELET # BLD AUTO: 523 X10*3/UL (ref 150–450)
POTASSIUM SERPL-SCNC: 3.7 MMOL/L (ref 3.5–5.3)
RBC # BLD AUTO: 3.66 X10*6/UL (ref 4–5.2)
SODIUM SERPL-SCNC: 141 MMOL/L (ref 136–145)
WBC # BLD AUTO: 11.4 X10*3/UL (ref 4.4–11.3)

## 2025-02-27 PROCEDURE — 2500000002 HC RX 250 W HCPCS SELF ADMINISTERED DRUGS (ALT 637 FOR MEDICARE OP, ALT 636 FOR OP/ED): Mod: IPSPLIT | Performed by: NURSE PRACTITIONER

## 2025-02-27 PROCEDURE — 94760 N-INVAS EAR/PLS OXIMETRY 1: CPT | Mod: IPSPLIT

## 2025-02-27 PROCEDURE — 1100000001 HC PRIVATE ROOM DAILY: Mod: IPSPLIT

## 2025-02-27 PROCEDURE — 99232 SBSQ HOSP IP/OBS MODERATE 35: CPT

## 2025-02-27 PROCEDURE — 82374 ASSAY BLOOD CARBON DIOXIDE: CPT | Mod: IPSPLIT

## 2025-02-27 PROCEDURE — 2500000004 HC RX 250 GENERAL PHARMACY W/ HCPCS (ALT 636 FOR OP/ED): Mod: IPSPLIT | Performed by: NURSE PRACTITIONER

## 2025-02-27 PROCEDURE — 2500000001 HC RX 250 WO HCPCS SELF ADMINISTERED DRUGS (ALT 637 FOR MEDICARE OP): Mod: IPSPLIT | Performed by: NURSE PRACTITIONER

## 2025-02-27 PROCEDURE — 2500000004 HC RX 250 GENERAL PHARMACY W/ HCPCS (ALT 636 FOR OP/ED): Mod: IPSPLIT

## 2025-02-27 PROCEDURE — 2500000005 HC RX 250 GENERAL PHARMACY W/O HCPCS: Mod: IPSPLIT

## 2025-02-27 PROCEDURE — 85027 COMPLETE CBC AUTOMATED: CPT | Mod: IPSPLIT

## 2025-02-27 PROCEDURE — 2500000001 HC RX 250 WO HCPCS SELF ADMINISTERED DRUGS (ALT 637 FOR MEDICARE OP): Mod: IPSPLIT

## 2025-02-27 PROCEDURE — 36415 COLL VENOUS BLD VENIPUNCTURE: CPT | Mod: IPSPLIT

## 2025-02-27 RX ORDER — SPIRONOLACTONE 25 MG/1
25 TABLET ORAL DAILY
Status: DISCONTINUED | OUTPATIENT
Start: 2025-02-27 | End: 2025-03-01 | Stop reason: HOSPADM

## 2025-02-27 RX ADMIN — IRON SUCROSE 200 MG: 20 INJECTION, SOLUTION INTRAVENOUS at 06:36

## 2025-02-27 RX ADMIN — DIPHENHYDRAMINE HYDROCHLORIDE 25 MG: 25 CAPSULE ORAL at 20:50

## 2025-02-27 RX ADMIN — PREDNISONE 40 MG: 20 TABLET ORAL at 09:40

## 2025-02-27 RX ADMIN — PANTOPRAZOLE SODIUM 40 MG: 40 TABLET, DELAYED RELEASE ORAL at 06:22

## 2025-02-27 RX ADMIN — POLYETHYLENE GLYCOL 3350 17 G: 17 POWDER, FOR SOLUTION ORAL at 09:43

## 2025-02-27 RX ADMIN — FUROSEMIDE 20 MG: 20 TABLET ORAL at 09:41

## 2025-02-27 RX ADMIN — FLUOCINONIDE: 0.5 CREAM TOPICAL at 09:34

## 2025-02-27 RX ADMIN — FERROUS SULFATE TAB 325 MG (65 MG ELEMENTAL FE) 325 MG: 325 (65 FE) TAB at 09:44

## 2025-02-27 RX ADMIN — SENNOSIDES AND DOCUSATE SODIUM 1 TABLET: 50; 8.6 TABLET ORAL at 20:53

## 2025-02-27 RX ADMIN — SPIRONOLACTONE 25 MG: 25 TABLET, FILM COATED ORAL at 09:44

## 2025-02-27 RX ADMIN — DOCUSATE SODIUM 100 MG: 100 CAPSULE, LIQUID FILLED ORAL at 20:53

## 2025-02-27 RX ADMIN — DOCUSATE SODIUM 100 MG: 100 CAPSULE, LIQUID FILLED ORAL at 09:40

## 2025-02-27 RX ADMIN — Medication 6 MG: at 20:50

## 2025-02-27 RX ADMIN — CEFTRIAXONE 1 G: 1 INJECTION, SOLUTION INTRAVENOUS at 09:43

## 2025-02-27 ASSESSMENT — COGNITIVE AND FUNCTIONAL STATUS - GENERAL
MOVING TO AND FROM BED TO CHAIR: A LITTLE
TURNING FROM BACK TO SIDE WHILE IN FLAT BAD: A LITTLE
STANDING UP FROM CHAIR USING ARMS: A LITTLE
CLIMB 3 TO 5 STEPS WITH RAILING: A LITTLE
DRESSING REGULAR UPPER BODY CLOTHING: A LITTLE
WALKING IN HOSPITAL ROOM: A LITTLE
HELP NEEDED FOR BATHING: A LITTLE
DRESSING REGULAR LOWER BODY CLOTHING: A LITTLE
PERSONAL GROOMING: A LITTLE
TURNING FROM BACK TO SIDE WHILE IN FLAT BAD: A LITTLE
TOILETING: A LITTLE
EATING MEALS: A LITTLE
TOILETING: A LITTLE
DRESSING REGULAR LOWER BODY CLOTHING: A LITTLE
DAILY ACTIVITIY SCORE: 18
STANDING UP FROM CHAIR USING ARMS: A LITTLE
CLIMB 3 TO 5 STEPS WITH RAILING: A LITTLE
PERSONAL GROOMING: A LITTLE
HELP NEEDED FOR BATHING: A LITTLE
DRESSING REGULAR UPPER BODY CLOTHING: A LITTLE
EATING MEALS: A LITTLE
MOVING TO AND FROM BED TO CHAIR: A LITTLE
DAILY ACTIVITIY SCORE: 18
MOBILITY SCORE: 19
MOBILITY SCORE: 19
WALKING IN HOSPITAL ROOM: A LITTLE

## 2025-02-27 ASSESSMENT — PAIN SCALES - GENERAL
PAINLEVEL_OUTOF10: 0 - NO PAIN
PAINLEVEL_OUTOF10: 0 - NO PAIN

## 2025-02-27 ASSESSMENT — PAIN SCALES - WONG BAKER: WONGBAKER_NUMERICALRESPONSE: NO HURT

## 2025-02-27 NOTE — PROGRESS NOTES
Chelsey Fletcher is a 84 y.o. female on day 2 of admission presenting with Symptom of congestive heart failure.      Subjective   Pt assessed at bedside. Says she is feeling okay. Still requiring 2L of oxygen. She thought her kids brought her here as a joke. She knows she is in the hospital.        Objective     Last Recorded Vitals  /66 (BP Location: Left arm, Patient Position: Lying)   Pulse 78   Temp 36.4 °C (97.5 °F) (Temporal)   Resp 16   Wt 63.7 kg (140 lb 8 oz)   SpO2 92%   Intake/Output last 3 Shifts:    Intake/Output Summary (Last 24 hours) at 2/27/2025 1000  Last data filed at 2/26/2025 1800  Gross per 24 hour   Intake 1140 ml   Output 400 ml   Net 740 ml       Admission Weight  Weight: 68 kg (150 lb) (02/24/25 0955)    Daily Weight  02/27/25 : 63.7 kg (140 lb 8 oz)    Image Results  Transthoracic Echo (TTE) [facility], [address] [place] [address]               Tel [phone] and Fax [phone]    TRANSTHORACIC ECHOCARDIOGRAM REPORT       Patient Name:       CHELSEY FLETCHER     Reading Physician:    [id number] Katarina Tracey MD  Study Date:         2/25/2025           Ordering Provider:    [id number] VEGA  MRN/PID:            [id number]            Fellow:  Accession#:         [id number]        Nurse:  Date of Birth/Age:  1940 / 84     Sonographer:          Vanita jones                                     KRISHNA  Gender assigned at  F                   Additional Staff:  Birth:  Height:             160.02 cm           Admit Date:  Weight:             63.05 kg            Admission Status:     Inpatient -                                                                Routine  BSA / BMI:          1.66 m2 / 24.62     Encounter#:           [id number]                      kg/m2  Blood Pressure:      137/99 mmHg         Department Location:  [facility]    Study Type:    TRANSTHORACIC ECHO (TTE) COMPLETE  Diagnosis/ICD: Heart failure, unspecified-I50.9  Indication:    Congestive Heart Failure  CPT Code:      Echo Complete w Full Doppler-[id number]    Patient History:  Pertinent History: CHF, Hyperlipidemia, Dyspnea and Murmur.    Study Detail: The following Echo studies were performed: 2D, M-Mode, Doppler and                color flow. Technically challenging study due to body habitus. The                patient was awake.       PHYSICIAN INTERPRETATION:  Left Ventricle: Left ventricular ejection fraction is normal, by visual estimate at 60-65%. There are no regional left ventricular wall motion abnormalities. The left ventricular cavity size is normal. There is mildly increased posterior left ventricular wall thickness. There is left ventricular concentric remodeling. Spectral Doppler shows an abnormal pattern of left ventricular diastolic filling.  Left Atrium: The left atrium is mildly dilated.  Right Ventricle: The right ventricle is normal in size. There is normal right ventricular global systolic function.  Right Atrium: The right atrium is normal in size.  Aortic Valve: The aortic valve is trileaflet. There is no evidence of aortic valve regurgitation. The peak instantaneous gradient of the aortic valve is 15 mmHg.  Mitral Valve: The mitral valve is normal in structure. There is no evidence of mitral valve regurgitation.  Tricuspid Valve: The tricuspid valve is structurally normal. There is mild tricuspid regurgitation.  Pulmonic Valve: The pulmonic valve is not well visualized. There is physiologic pulmonic valve regurgitation.  Pericardium: There is no pericardial effusion noted.  Aorta: The aortic root was not well visualized.       CONCLUSIONS:   1. Left ventricular ejection fraction is normal, by visual estimate at 60-65%.   2.  Spectral Doppler shows an abnormal pattern of left ventricular diastolic filling.   3. There is normal right ventricular global systolic function.   4. The left atrium is mildly dilated.    QUANTITATIVE DATA SUMMARY:     2D MEASUREMENTS:          Normal Ranges:  Ao Root d:       3.00 cm  (2.0-3.7cm)  LAs:             3.00 cm  (2.7-4.0cm)  IVSd:            1.25 cm  (0.6-1.1cm)  LVPWd:           1.03 cm  (0.6-1.1cm)  LVIDd:           4.05 cm  (3.9-5.9cm)  LVIDs:           2.81 cm  LV Mass Index:   94 g/m2  LVEDV Index:     45 ml/m2  LV % FS          30.6 %       LEFT ATRIUM:                  Normal Ranges:  LA Vol A4C:        56.9 ml    (22+/-6mL/m2)  LA Vol A2C:        49.0 ml  LA Vol BP:         55.6 ml  LA Vol Index A4C:  34.3ml/m2  LA Vol Index A2C:  29.6 ml/m2  LA Vol Index BP:   33.5 ml/m2  LA Area A4C:       20.2 cm2  LA Area A2C:       17.8 cm2  LA Major Axis A4C: 6.1 cm  LA Major Axis A2C: 5.5 cm  LA Volume Index:   32.0 ml/m2       RIGHT ATRIUM:                 Normal Ranges:  RA Vol A4C:        50.5 ml    (8.3-19.5ml)  RA Vol Index A4C:  30.5 ml/m2  RA Area A4C:       17.4 cm2  RA Major Axis A4C: 5.1 cm       M-MODE MEASUREMENTS:         Normal Ranges:  Ao Root:             2.90 cm (2.0-3.7cm)  LAs:                 3.40 cm (2.7-4.0cm)       AORTA MEASUREMENTS:         Normal Ranges:  Asc Ao, d:          2.60 cm (2.1-3.4cm)       LV SYSTOLIC FUNCTION:                       Normal Ranges:  EF-A4C View:    58 % (>=55%)  EF-A2C View:    65 %  EF-Biplane:     63 %  EF-Visual:      63 %  LV EF Reported: 63 %       LV DIASTOLIC FUNCTION:             Normal Ranges:  MV Peak E:             1.19 m/s    (0.7-1.2 m/s)  MV Peak A:             1.03 m/s    (0.42-0.7 m/s)  E/A Ratio:             1.16        (1.0-2.2)  MV e'                  0.054 m/s   (>8.0)  MV lateral e'          0.06 m/s  MV medial e'           0.05 m/s  MV A Dur:              106.00 msec  E/e' Ratio:            21.87       (<8.0)  PulmV Sys Demetrius:          93.90 cm/s  PulmV Bull Demetrius:        77.60 cm/s  PulmV S/D Demetrius:         1.20       MITRAL VALVE:          Normal Ranges:  MV DT:        182 msec (150-240msec)       AORTIC VALVE:            Normal Ranges:  AoV Vmax:      1.93 m/s  (<=1.7m/s)  AoV Peak P.9 mmHg (<20mmHg)  LVOT Max Demetrius:  1.09 m/s  (<=1.1m/s)  LVOT VTI:      23.70 cm  LVOT Diameter: 1.90 cm   (1.8-2.4cm)  AoV Area,Vmax: 1.60 cm2  (2.5-4.5cm2)       RIGHT VENTRICLE:  RV Basal 4.00 cm  RV Mid   3.00 cm  RV Major 6.8 cm  TAPSE:   19.0 mm  RV s'    0.15 m/s       TRICUSPID VALVE/RVSP:          Normal Ranges:  Peak TR Velocity:     2.67 m/s  RV Syst Pressure:     37 mmHg  (< 30mmHg)  IVC Diam:             2.00 cm       PULMONIC VALVE:          Normal Ranges:  PV Max Demetrius:     1.1 m/s  (0.6-0.9m/s)  PV Max P.0 mmHg       PULMONARY VEINS:  PulmV Bull Demetrius: 77.60 cm/s  PulmV S/D Demetrius:  1.20  PulmV Sys Demetrius:  93.90 cm/s       [id number] Katarina Tracey MD  Electronically signed on 2025 at 5:53:48 PM       ** Final **  ECG 12 lead  Normal sinus rhythm  Nonspecific ST and T wave abnormality  Abnormal ECG  When compared with ECG of 1999 08:28,  Nonspecific T wave abnormality now evident in Lateral leads      Physical Exam  Vitals reviewed.   HENT:      Head: Normocephalic and atraumatic.      Right Ear: External ear normal.      Left Ear: External ear normal.      Nose: Nose normal.      Mouth/Throat:      Pharynx: Oropharynx is clear.   Eyes:      Conjunctiva/sclera: Conjunctivae normal.   Cardiovascular:      Rate and Rhythm: Normal rate and regular rhythm.      Pulses: Normal pulses.      Heart sounds: Normal heart sounds.   Pulmonary:      Breath sounds: Decreased breath sounds present.   Abdominal:      General: Bowel sounds are normal.      Palpations: Abdomen is soft.   Musculoskeletal:         General: Normal range of motion.      Cervical back: Normal range of motion and neck supple.   Skin:     Findings: Rash present.       Comments: Scratches    Neurological:      General: No focal deficit present.      Mental Status: She is alert. She is disoriented.      Motor: Weakness present.   Psychiatric:         Mood and Affect: Mood normal.         Behavior: Behavior normal.       Relevant Results  Scheduled medications  cefTRIAXone, 1 g, intravenous, q24h  docusate sodium, 100 mg, oral, BID  [Held by provider] enoxaparin, 40 mg, subcutaneous, q24h  ferrous sulfate, 65 mg of iron, oral, Daily with breakfast  influenza, 0.5 mL, intramuscular, During hospitalization  fluocinonide, , Topical, BID  furosemide, 20 mg, oral, Daily  iron sucrose, 200 mg, intravenous, Daily  pantoprazole, 40 mg, oral, Daily before breakfast   Or  pantoprazole, 40 mg, intravenous, Daily before breakfast  perflutren lipid microspheres, 0.5-10 mL of dilution, intravenous, Once in imaging  perflutren protein A microsphere, 0.5 mL, intravenous, Once in imaging  pneumococcal polysaccharide, 0.5 mL, intramuscular, During hospitalization  polyethylene glycol, 17 g, oral, Daily  predniSONE, 40 mg, oral, Daily  sennosides-docusate sodium, 1 tablet, oral, Nightly  spironolactone, 25 mg, oral, Daily  sulfur hexafluoride microsphr, 2 mL, intravenous, Once in imaging      Continuous medications     PRN medications  PRN medications: acetaminophen **OR** acetaminophen **OR** acetaminophen, acetaminophen **OR** acetaminophen **OR** acetaminophen, benzocaine-menthol, dextromethorphan-guaifenesin, diphenhydrAMINE, diphenhydramine-zinc acetate, guaiFENesin, melatonin, ondansetron ODT **OR** ondansetron, oxygen    Results for orders placed or performed during the hospital encounter of 02/24/25 (from the past 24 hours)   Hemoglobin and hematocrit, blood   Result Value Ref Range    Hemoglobin 7.3 (L) 12.0 - 16.0 g/dL    Hematocrit 25.7 (L) 36.0 - 46.0 %   Occult Blood, Stool    Specimen: Stool   Result Value Ref Range    Occult Blood, Stool X1 Positive (A) Negative          Assessment/Plan       Assessment & Plan  Symptom of congestive heart failure    Acute congestive heart failure, unspecified heart failure type    Eczema    Elevated brain natriuretic peptide (BNP) level    Elevated troponin    Hypoxia    Acute cystitis without hematuria    Iron deficiency anemia    #Elevated BNP  #Elevated troponin  #Hypoxia  -Spouse reports worsening shortness of breath with activity and periorbital edema, worse in the morning and improves throughout the day   -Patient required 2L O2 in the ED, on room air today and requires 2 to 3 liters last night with 02 sat 85 to 88%  Hx of smoking in the past,  still smokes;  will need overnight trend as well as possible eval for copd  -CTA chest: No acute pulmonary embolism to the segmental level. 2. Bilateral interstitial edema. 3. Small bilateral pleural effusions with atelectasis/infiltrate,   left-greater-than-right.   -Echo:   1. Left ventricular ejection fraction is normal, by visual estimate at 60-65%.   2. Spectral Doppler shows an abnormal pattern of left ventricular diastolic filling.   3. There is normal right ventricular global systolic function.   4. The left atrium is mildly dilated.  -Trial Lasix 20 mg IV x 1 dose yesterday and repeat today. Did have good results. and monitor output   -Appears euvolemic at this time   -Strict I&Os  -LOS +1420  -Daily weights  -1800 mL daily fluid restriction, 2g Na diet  -Continuous telemetry monitoring   -Cardiology consult, appreciate recs      #UTI, resolved   -UA: trace ketones, 75 leuk esterase  -Urine cx negative   -DC abx     #Eczema   -Present on admission   -Patient brought in Eucerin cream and Aquaphor that she uses at home  -fluocinonide 0.05% cream BID x 5 days  -prednisone 40 mg PO every day x 5 days  -Benadryl 25 mg q6h PRN for itching  -Refer to derm on discharge     #Anemia, iron deficiency   -H/H 7.9/27.4 > 7.5/28.2 > 7.3/25.7   -Has seen hem/onc in the past; last seen in September 2024 (PAYAL Arnold CNP)    -Iron studies: iron 17, 5% saturation, ferritin 16  -Occult stool positive  -No s/s of bleeding noted, continue to monitor  -Consider ACS consult if hgb continues to downtrend with positive occult   - Patient has received Venofer infusions in the past but had difficulty tolerating; had worsening itchiness/rash per hem/onc notes. Pt willing to try today.   -ferrous sulfate every day, increase to bid at discharge   -Hold Lovenox   -Monitor for active bleeding  -Daily CBC   -follow up with hem/onc on discharge- refer to Dr. Pedraza to re-establish care     DVT ppx  -SCDs    PUD ppx  -pantoprazole    F: PRN  E: Replete per protocol  N: Cardiac, 1800ml FR   A: PIV    Disposition: Pt requires more than 2 inpatient days at this time   Code Status: Full Code         FLORA Alcaraz-CNP

## 2025-02-27 NOTE — CARE PLAN
The patient's goals for the shift include   to stop itching so bad     The clinical goals for the shift include pt will have decreased swelling throughout the shift    Problem: Pain - Adult  Goal: Verbalizes/displays adequate comfort level or baseline comfort level  Outcome: Progressing     Problem: Safety - Adult  Goal: Free from fall injury  Outcome: Progressing     Problem: Discharge Planning  Goal: Discharge to home or other facility with appropriate resources  Outcome: Progressing     Problem: Chronic Conditions and Co-morbidities  Goal: Patient's chronic conditions and co-morbidity symptoms are monitored and maintained or improved  Outcome: Progressing     Problem: Nutrition  Goal: Nutrient intake appropriate for maintaining nutritional needs  Outcome: Progressing   Pt has had an uneventful day. Vitals have been stable. The steroid cream we are using for pt's skin has been helping her a lot. She has notbeen itching as bad and her back looks so much better compared to yesterday, Pt has complained of no pain at this time. Call light is within reach and no needs at this time.

## 2025-02-28 LAB
ANION GAP SERPL CALC-SCNC: 10 MMOL/L (ref 10–20)
ATRIAL RATE: 81 BPM
BUN SERPL-MCNC: 19 MG/DL (ref 6–23)
CALCIUM SERPL-MCNC: 8.8 MG/DL (ref 8.6–10.3)
CHLORIDE SERPL-SCNC: 107 MMOL/L (ref 98–107)
CO2 SERPL-SCNC: 28 MMOL/L (ref 21–32)
CREAT SERPL-MCNC: 0.71 MG/DL (ref 0.5–1.05)
EGFRCR SERPLBLD CKD-EPI 2021: 84 ML/MIN/1.73M*2
ERYTHROCYTE [DISTWIDTH] IN BLOOD BY AUTOMATED COUNT: 19.9 % (ref 11.5–14.5)
GLUCOSE SERPL-MCNC: 89 MG/DL (ref 74–99)
HCT VFR BLD AUTO: 25.9 % (ref 36–46)
HGB BLD-MCNC: 7.2 G/DL (ref 12–16)
MCH RBC QN AUTO: 20.2 PG (ref 26–34)
MCHC RBC AUTO-ENTMCNC: 27.8 G/DL (ref 32–36)
MCV RBC AUTO: 73 FL (ref 80–100)
NRBC BLD-RTO: 1.4 /100 WBCS (ref 0–0)
P AXIS: 33 DEGREES
P OFFSET: 181 MS
P ONSET: 127 MS
PLATELET # BLD AUTO: 482 X10*3/UL (ref 150–450)
POTASSIUM SERPL-SCNC: 3.2 MMOL/L (ref 3.5–5.3)
PR INTERVAL: 196 MS
Q ONSET: 225 MS
QRS COUNT: 14 BEATS
QRS DURATION: 64 MS
QT INTERVAL: 352 MS
QTC CALCULATION(BAZETT): 408 MS
QTC FREDERICIA: 388 MS
R AXIS: 47 DEGREES
RBC # BLD AUTO: 3.56 X10*6/UL (ref 4–5.2)
SODIUM SERPL-SCNC: 142 MMOL/L (ref 136–145)
T AXIS: 21 DEGREES
T OFFSET: 401 MS
VENTRICULAR RATE: 81 BPM
WBC # BLD AUTO: 12.5 X10*3/UL (ref 4.4–11.3)

## 2025-02-28 PROCEDURE — 99232 SBSQ HOSP IP/OBS MODERATE 35: CPT | Performed by: NURSE PRACTITIONER

## 2025-02-28 PROCEDURE — 36415 COLL VENOUS BLD VENIPUNCTURE: CPT | Mod: IPSPLIT

## 2025-02-28 PROCEDURE — 2500000001 HC RX 250 WO HCPCS SELF ADMINISTERED DRUGS (ALT 637 FOR MEDICARE OP): Mod: IPSPLIT

## 2025-02-28 PROCEDURE — 2500000005 HC RX 250 GENERAL PHARMACY W/O HCPCS: Mod: IPSPLIT

## 2025-02-28 PROCEDURE — 2500000002 HC RX 250 W HCPCS SELF ADMINISTERED DRUGS (ALT 637 FOR MEDICARE OP, ALT 636 FOR OP/ED): Mod: IPSPLIT | Performed by: NURSE PRACTITIONER

## 2025-02-28 PROCEDURE — 2500000005 HC RX 250 GENERAL PHARMACY W/O HCPCS: Mod: IPSPLIT | Performed by: EMERGENCY MEDICINE

## 2025-02-28 PROCEDURE — 80048 BASIC METABOLIC PNL TOTAL CA: CPT | Mod: IPSPLIT

## 2025-02-28 PROCEDURE — 2500000004 HC RX 250 GENERAL PHARMACY W/ HCPCS (ALT 636 FOR OP/ED): Mod: IPSPLIT

## 2025-02-28 PROCEDURE — 94760 N-INVAS EAR/PLS OXIMETRY 1: CPT | Mod: IPSPLIT

## 2025-02-28 PROCEDURE — 2500000001 HC RX 250 WO HCPCS SELF ADMINISTERED DRUGS (ALT 637 FOR MEDICARE OP): Mod: IPSPLIT | Performed by: NURSE PRACTITIONER

## 2025-02-28 PROCEDURE — 1100000001 HC PRIVATE ROOM DAILY: Mod: IPSPLIT

## 2025-02-28 PROCEDURE — 94762 N-INVAS EAR/PLS OXIMTRY CONT: CPT | Mod: IPSPLIT

## 2025-02-28 PROCEDURE — 2500000004 HC RX 250 GENERAL PHARMACY W/ HCPCS (ALT 636 FOR OP/ED): Mod: IPSPLIT | Performed by: NURSE PRACTITIONER

## 2025-02-28 PROCEDURE — 85027 COMPLETE CBC AUTOMATED: CPT | Mod: IPSPLIT

## 2025-02-28 RX ORDER — POTASSIUM CHLORIDE 20 MEQ/1
20 TABLET, EXTENDED RELEASE ORAL ONCE
Status: COMPLETED | OUTPATIENT
Start: 2025-02-28 | End: 2025-02-28

## 2025-02-28 RX ADMIN — POTASSIUM CHLORIDE 20 MEQ: 1500 TABLET, EXTENDED RELEASE ORAL at 09:22

## 2025-02-28 RX ADMIN — POLYETHYLENE GLYCOL 3350 17 G: 17 POWDER, FOR SOLUTION ORAL at 09:20

## 2025-02-28 RX ADMIN — SENNOSIDES AND DOCUSATE SODIUM 1 TABLET: 50; 8.6 TABLET ORAL at 20:13

## 2025-02-28 RX ADMIN — ACETAMINOPHEN 650 MG: 325 TABLET, FILM COATED ORAL at 20:13

## 2025-02-28 RX ADMIN — DOCUSATE SODIUM 100 MG: 100 CAPSULE, LIQUID FILLED ORAL at 20:13

## 2025-02-28 RX ADMIN — Medication 6 MG: at 20:13

## 2025-02-28 RX ADMIN — IRON SUCROSE 200 MG: 20 INJECTION, SOLUTION INTRAVENOUS at 06:09

## 2025-02-28 RX ADMIN — Medication 2 L/MIN: at 02:20

## 2025-02-28 RX ADMIN — SPIRONOLACTONE 25 MG: 25 TABLET, FILM COATED ORAL at 09:20

## 2025-02-28 RX ADMIN — DIPHENHYDRAMINE HYDROCHLORIDE 25 MG: 25 CAPSULE ORAL at 20:13

## 2025-02-28 RX ADMIN — PANTOPRAZOLE SODIUM 40 MG: 40 INJECTION, POWDER, LYOPHILIZED, FOR SOLUTION INTRAVENOUS at 06:09

## 2025-02-28 RX ADMIN — FLUOCINONIDE: 0.5 CREAM TOPICAL at 09:21

## 2025-02-28 RX ADMIN — PREDNISONE 40 MG: 20 TABLET ORAL at 09:20

## 2025-02-28 RX ADMIN — DOCUSATE SODIUM 100 MG: 100 CAPSULE, LIQUID FILLED ORAL at 09:20

## 2025-02-28 RX ADMIN — FERROUS SULFATE TAB 325 MG (65 MG ELEMENTAL FE) 325 MG: 325 (65 FE) TAB at 09:20

## 2025-02-28 RX ADMIN — Medication 2 L/MIN: at 21:38

## 2025-02-28 RX ADMIN — FLUOCINONIDE: 0.5 CREAM TOPICAL at 20:15

## 2025-02-28 RX ADMIN — FUROSEMIDE 20 MG: 20 TABLET ORAL at 09:20

## 2025-02-28 ASSESSMENT — COGNITIVE AND FUNCTIONAL STATUS - GENERAL
TURNING FROM BACK TO SIDE WHILE IN FLAT BAD: A LITTLE
CLIMB 3 TO 5 STEPS WITH RAILING: A LITTLE
DAILY ACTIVITIY SCORE: 18
EATING MEALS: A LITTLE
DRESSING REGULAR LOWER BODY CLOTHING: A LITTLE
HELP NEEDED FOR BATHING: A LITTLE
DRESSING REGULAR UPPER BODY CLOTHING: A LITTLE
MOBILITY SCORE: 19
STANDING UP FROM CHAIR USING ARMS: A LITTLE
TOILETING: A LITTLE
WALKING IN HOSPITAL ROOM: A LITTLE
MOVING TO AND FROM BED TO CHAIR: A LITTLE
PERSONAL GROOMING: A LITTLE

## 2025-02-28 ASSESSMENT — PAIN DESCRIPTION - LOCATION: LOCATION: GENERALIZED

## 2025-02-28 ASSESSMENT — PAIN SCALES - GENERAL
PAINLEVEL_OUTOF10: 0 - NO PAIN
PAINLEVEL_OUTOF10: 0 - NO PAIN
PAINLEVEL_OUTOF10: 2

## 2025-02-28 ASSESSMENT — PAIN - FUNCTIONAL ASSESSMENT
PAIN_FUNCTIONAL_ASSESSMENT: 0-10
PAIN_FUNCTIONAL_ASSESSMENT: 0-10

## 2025-02-28 NOTE — PROCEDURES
Pt completed an overnight pulse ox trend on room air, on the night of 2/27/25.  Report start date: 2/27/25, 10:00:00 PM.  Report end date: 2/28/25 6:11:00 AM.  Total time below spo2 88%: 18 min 8 sec.  Longest duration: 50 sec.  Lowest spo2: 83 at 02/28/25 01:58:08 AM.  Number of events: 96.  Pt placed on 2L nc at 02:20 AM per rn.

## 2025-02-28 NOTE — NURSING NOTE
Met with patient and  at [facility] prior to discharge.  Patient and  was provided with CHF education, scale and fluid tumbler with measurements.    verbalized understanding of educational materials and was provided time to ask questions.  Patient denies having heart failure. Patient also questioned why they were here.  This hospital to home nurse will conduct a follow-up phone visit with patient in 1-2 weeks after discharge, with the exception if the patient is discharged to a nursing facility.  WINSTON ChengN, Hospital [facility] RN.

## 2025-02-28 NOTE — PROGRESS NOTES
Nicole Bobo is a 84 y.o. female on day 3 of admission presenting with Symptom of congestive heart failure.      Subjective   Very irritable this am - did not want to get up / interact - denies pain, sob or cough denies difficulty with eating or moving bowels        Objective     Last Recorded Vitals  /69 (BP Location: Left arm, Patient Position: Lying)   Pulse 75   Temp 36.4 °C (97.5 °F) (Temporal)   Resp 18   Wt 62.8 kg (138 lb 6.4 oz)   SpO2 99%   Intake/Output last 3 Shifts:    Intake/Output Summary (Last 24 hours) at 2/28/2025 1538  Last data filed at 2/28/2025 1300  Gross per 24 hour   Intake 600 ml   Output 850 ml   Net -250 ml       Admission Weight  Weight: 68 kg (150 lb) (02/24/25 0955)    Daily Weight  02/28/25 : 62.8 kg (138 lb 6.4 oz)    Image Results  ECG 12 lead  Normal sinus rhythm  Nonspecific ST and T wave abnormality  Abnormal ECG  When compared with ECG of 01-NOV-1999 08:28,  Nonspecific T wave abnormality now evident in Lateral leads  See ED provider note for full interpretation and clinical correlation  Confirmed by Tammy Wright ([id number]) on 2/28/2025 12:37:30 PM      Physical Exam  HENT:      Head: Normocephalic.      Nose: Nose normal.      Mouth/Throat:      Mouth: Mucous membranes are moist.   Eyes:      Extraocular Movements: Extraocular movements intact.   Cardiovascular:      Rate and Rhythm: Normal rate and regular rhythm.      Pulses: Normal pulses.   Pulmonary:      Effort: Pulmonary effort is normal.      Breath sounds: Normal breath sounds.   Abdominal:      General: Bowel sounds are normal.      Palpations: Abdomen is soft.   Musculoskeletal:         General: Normal range of motion.      Cervical back: Normal range of motion.   Skin:     General: Skin is dry.      Capillary Refill: Capillary refill takes less than 2 seconds.   Neurological:      General: No focal deficit present.      Mental Status: She is alert and oriented to person, place, and time.    Psychiatric:         Mood and Affect: Mood normal.         Behavior: Behavior normal.         Relevant Results           Scheduled medications  docusate sodium, 100 mg, oral, BID  [Held by provider] enoxaparin, 40 mg, subcutaneous, q24h  ferrous sulfate, 65 mg of iron, oral, Daily with breakfast  influenza, 0.5 mL, intramuscular, During hospitalization  fluocinonide, , Topical, BID  furosemide, 20 mg, oral, Daily  pantoprazole, 40 mg, oral, Daily before breakfast   Or  pantoprazole, 40 mg, intravenous, Daily before breakfast  perflutren lipid microspheres, 0.5-10 mL of dilution, intravenous, Once in imaging  perflutren protein A microsphere, 0.5 mL, intravenous, Once in imaging  pneumococcal polysaccharide, 0.5 mL, intramuscular, During hospitalization  polyethylene glycol, 17 g, oral, Daily  predniSONE, 40 mg, oral, Daily  sennosides-docusate sodium, 1 tablet, oral, Nightly  spironolactone, 25 mg, oral, Daily  sulfur hexafluoride microsphr, 2 mL, intravenous, Once in imaging      Continuous medications     PRN medications  PRN medications: acetaminophen **OR** acetaminophen **OR** acetaminophen, acetaminophen **OR** acetaminophen **OR** acetaminophen, benzocaine-menthol, dextromethorphan-guaifenesin, diphenhydrAMINE, diphenhydramine-zinc acetate, guaiFENesin, melatonin, ondansetron ODT **OR** ondansetron, oxygen  .cnt  ECG 12 lead    Result Date: 2/28/2025  Normal sinus rhythm Nonspecific ST and T wave abnormality Abnormal ECG When compared with ECG of 01-NOV-1999 08:28, Nonspecific T wave abnormality now evident in Lateral leads See ED provider note for full interpretation and clinical correlation Confirmed by Tammy Wright ([id number]) on 2/28/2025 12:37:30 PM    Transthoracic Echo (TTE) Complete    Result Date: 2/26/2025   [facility], [address] [address]              Tel [phone] and Fax [phone] TRANSTHORACIC ECHOCARDIOGRAM REPORT  Patient Name:       CHELSEY FLETCHER      Reading Physician:    [id number] Katarina Tracey MD Study Date:         2/25/2025           Ordering Provider:    [id number] GENA VEGA MRN/PID:            [id number]            Fellow: Accession#:         [id number]        Nurse: Date of Birth/Age:  1940 / 84     Sonographer:          Vanita jones RDCS Gender assigned at  F                   Additional Staff: Birth: Height:             160.02 cm           Admit Date: Weight:             63.05 kg            Admission Status:     Inpatient -                                                               Routine BSA / BMI:          1.66 m2 / 24.62     Encounter#:           [id number]                     kg/m2 Blood Pressure:     137/99 mmHg         Department Location:  [facility] Study Type:    TRANSTHORACIC ECHO (TTE) COMPLETE Diagnosis/ICD: Heart failure, unspecified-I50.9 Indication:    Congestive Heart Failure CPT Code:      Echo Complete w Full [id number] Patient History: Pertinent History: CHF, Hyperlipidemia, Dyspnea and Murmur. Study Detail: The following Echo studies were performed: 2D, M-Mode, Doppler and               color flow. Technically challenging study due to body habitus. The               patient was awake.  PHYSICIAN INTERPRETATION: Left Ventricle: Left ventricular ejection fraction is normal, by visual estimate at 60-65%. There are no regional left ventricular wall motion abnormalities. The left ventricular cavity size is normal. There is mildly increased posterior left ventricular wall thickness. There is left ventricular concentric remodeling. Spectral Doppler shows an abnormal pattern of left ventricular diastolic filling. Left Atrium: The left atrium is mildly dilated. Right  Ventricle: The right ventricle is normal in size. There is normal right ventricular global systolic function. Right Atrium: The right atrium is normal in size. Aortic Valve: The aortic valve is trileaflet. There is no evidence of aortic valve regurgitation. The peak instantaneous gradient of the aortic valve is 15 mmHg. Mitral Valve: The mitral valve is normal in structure. There is no evidence of mitral valve regurgitation. Tricuspid Valve: The tricuspid valve is structurally normal. There is mild tricuspid regurgitation. Pulmonic Valve: The pulmonic valve is not well visualized. There is physiologic pulmonic valve regurgitation. Pericardium: There is no pericardial effusion noted. Aorta: The aortic root was not well visualized.  CONCLUSIONS:  1. Left ventricular ejection fraction is normal, by visual estimate at 60-65%.  2. Spectral Doppler shows an abnormal pattern of left ventricular diastolic filling.  3. There is normal right ventricular global systolic function.  4. The left atrium is mildly dilated. QUANTITATIVE DATA SUMMARY:  2D MEASUREMENTS:          Normal Ranges: Ao Root d:       3.00 cm  (2.0-3.7cm) LAs:             3.00 cm  (2.7-4.0cm) IVSd:            1.25 cm  (0.6-1.1cm) LVPWd:           1.03 cm  (0.6-1.1cm) LVIDd:           4.05 cm  (3.9-5.9cm) LVIDs:           2.81 cm LV Mass Index:   94 g/m2 LVEDV Index:     45 ml/m2 LV % FS          30.6 %  LEFT ATRIUM:                  Normal Ranges: LA Vol A4C:        56.9 ml    (22+/-6mL/m2) LA Vol A2C:        49.0 ml LA Vol BP:         55.6 ml LA Vol Index A4C:  34.3ml/m2 LA Vol Index A2C:  29.6 ml/m2 LA Vol Index BP:   33.5 ml/m2 LA Area A4C:       20.2 cm2 LA Area A2C:       17.8 cm2 LA Major Axis A4C: 6.1 cm LA Major Axis A2C: 5.5 cm LA Volume Index:   32.0 ml/m2  RIGHT ATRIUM:                 Normal Ranges: RA Vol A4C:        50.5 ml    (8.3-19.5ml) RA Vol Index A4C:  30.5 ml/m2 RA Area A4C:       17.4 cm2 RA Major Axis A4C: 5.1 cm  M-MODE MEASUREMENTS:          Normal Ranges: Ao Root:             2.90 cm (2.0-3.7cm) LAs:                 3.40 cm (2.7-4.0cm)  AORTA MEASUREMENTS:         Normal Ranges: Asc Ao, d:          2.60 cm (2.1-3.4cm)  LV SYSTOLIC FUNCTION:                      Normal Ranges: EF-A4C View:    58 % (>=55%) EF-A2C View:    65 % EF-Biplane:     63 % EF-Visual:      63 % LV EF Reported: 63 %  LV DIASTOLIC FUNCTION:             Normal Ranges: MV Peak E:             1.19 m/s    (0.7-1.2 m/s) MV Peak A:             1.03 m/s    (0.42-0.7 m/s) E/A Ratio:             1.16        (1.0-2.2) MV e'                  0.054 m/s   (>8.0) MV lateral e'          0.06 m/s MV medial e'           0.05 m/s MV A Dur:              106.00 msec E/e' Ratio:            21.87       (<8.0) PulmV Sys Demetrius:         93.90 cm/s PulmV Bull Demetrius:        77.60 cm/s PulmV S/D Demetrius:         1.20  MITRAL VALVE:          Normal Ranges: MV DT:        182 msec (150-240msec)  AORTIC VALVE:            Normal Ranges: AoV Vmax:      1.93 m/s  (<=1.7m/s) AoV Peak P.9 mmHg (<20mmHg) LVOT Max Demetrius:  1.09 m/s  (<=1.1m/s) LVOT VTI:      23.70 cm LVOT Diameter: 1.90 cm   (1.8-2.4cm) AoV Area,Vmax: 1.60 cm2  (2.5-4.5cm2)  RIGHT VENTRICLE: RV Basal 4.00 cm RV Mid   3.00 cm RV Major 6.8 cm TAPSE:   19.0 mm RV s'    0.15 m/s  TRICUSPID VALVE/RVSP:          Normal Ranges: Peak TR Velocity:     2.67 m/s RV Syst Pressure:     37 mmHg  (< 30mmHg) IVC Diam:             2.00 cm  PULMONIC VALVE:          Normal Ranges: PV Max Demetrius:     1.1 m/s  (0.6-0.9m/s) PV Max P.0 mmHg  PULMONARY VEINS: PulmV Bull Demetrius: 77.60 cm/s PulmV S/D Demetrius:  1.20 PulmV Sys Demetrius:  93.90 cm/s  [id number] Katarina Tracey MD Electronically signed on 2025 at 5:53:48 PM  ** Final **     CT angio chest for pulmonary embolism    Result Date: 2025  Interpreted By:  Jodee Quiñonez, STUDY: CT ANGIO CHEST FOR PULMONARY EMBOLISM;  2025 12:35 pm   INDICATION: Signs/Symptoms:hypoxia, shortness of breath.     COMPARISON: CT  abdomen pelvis 06/15/2021   ACCESSION NUMBER(S): [id number]   ORDERING CLINICIAN: KYLER AREVALO   TECHNIQUE: Helical data acquisition of the chest was obtained after intravenous administration of 75 ML Omnipaque 350, as per PE protocol. Images were reformatted in coronal and sagittal planes. Axial and coronal maximum intensity projection (MIP) images were created and reviewed.   FINDINGS: LUNGS AND AIRWAYS: The trachea and central airways are patent. No endobronchial lesion. Small bilateral pleural effusions with atelectasis/infiltrate, left-greater-than-right. Bilateral smooth interlobular septal thickening and mosaic attenuation of the bilateral lungs, compatible with interstitial edema. A 7 mm nodule seen in the right middle lobe on series 407 image 176.   MEDIASTINUM AND KENYON, LOWER NECK AND AXILLA: The visualized thyroid gland is within normal limits.   No evidence of thoracic lymphadenopathy by CT criteria.   Esophagus appears within normal limits as seen.   HEART AND VESSELS: No discrete filling defects within the main pulmonary artery or its branches to segmental level. Please note that, assessment of subsegmental branches is limited and small peripheral emboli are not entirely excluded.   Main pulmonary artery and its branches are normal in caliber.   The thoracic aorta is of normal course and caliber with mild vascular calcifications.   No coronary artery calcifications are seen. The study is not optimized for evaluation of coronary arteries.   The cardiac chambers are not enlarged.   No evidence of pericardial effusion.   UPPER ABDOMEN: The visualized subdiaphragmatic structures demonstrate no remarkable findings.   CHEST WALL AND OSSEOUS STRUCTURES: There are no suspicious osseous lesions. Multilevel degenerative changes are present       1. No acute pulmonary embolism to the segmental level. 2. Bilateral interstitial edema. 3. Small bilateral pleural effusions with atelectasis/infiltrate,  left-greater-than-right. 4. Right middle lobe 7 mm nodule, for which follow-up CT chest is recommended in 6 months. (Reference: Kun Echevarria et al., Guidelines for management of incidental pulmonary nodules detected on CT images: From the Fleischner Society 2017, Radiology. 2017 Jul;284 (1):228-243.)   MACRO: None   Signed by: Jodee Quiñonez 2/24/2025 1:05 PM Dictation workstation:   [id number]    CT facial bones w IV contrast    Result Date: 2/24/2025  Interpreted By:  Paul Hdz, STUDY: CT FACIAL BONES W IV CONTRAST  2/24/2025 12:35 pm   INDICATION: Signs/Symptoms:facial swelling every morning x 1 week.  Now predominantly left periorbital swelling     COMPARISON: None.   ACCESSION NUMBER(S): [id number]   ORDERING CLINICIAN: KYLER AREVALO   TECHNIQUE: Intravenous contrast enhanced thin cut axial CT images through the facial bones were obtained and reconstructed in the coronal  and sagittal plane using 75 mL Omnipaque 350.   FINDINGS: The orbital preseptal soft tissues have mild edema/inflammation more so on the left extending lateral to the orbits again more so on the left. No localized fluid collection is noted.   Lens replacements have been performed bilaterally. No intraorbital inflammatory processes or masses are noted.   The orbital structures enhance normally.   No intracranial mass, infarct or hemorrhage is noted.   The left maxillary sinus has mild mucosal thickening inferiorly. The upper left middle molar tooth has periapical lucencies extending to the floor of the left maxillary sinus. The left mastoid air cells have small amounts of fluid.   The external auditory canals are relatively small in caliber and have small amounts of debris more so on the left.       The preseptal facial soft tissues have mild edema/inflammation more so on the left with no intra orbital mass or inflammation noted.   MACRO: None   Signed by: Paul Hdz 2/24/2025 12:49 PM Dictation workstation:   [id number]    XR  chest 1 view    Result Date: 2/24/2025  Interpreted By:  Jodee Quiñonez, STUDY: XR CHEST 1 VIEW;  2/24/2025 11:54 am   INDICATION: Signs/Symptoms:shortness of breath, hypoxia.   COMPARISON: 05/14/2021   ACCESSION NUMBER(S): [id number]   ORDERING CLINICIAN: KYLER AREVALO   FINDINGS: AP radiograph of the chest was provided.       CARDIOMEDIASTINAL SILHOUETTE: Cardiomediastinal silhouette is normal in size and configuration.   LUNGS: New retrocardiac and left basilar opacification. The right lung is clear. Coarse interstitial lung markings are noted. No pneumothorax.   ABDOMEN: No remarkable upper abdominal findings.   BONES: No acute osseous changes.       New retrocardiac and left basilar opacification, which could relate to underlying pleural effusion with superimposed infiltrate. Follow-up chest radiograph in 8-12 weeks after treatment or further evaluation with CT chest is recommended to exclude underlying neoplasm.   MACRO: Critical Finding:  See findings. Notification was initiated on 2/24/2025 at 12:12 pm by  Jodee Quiñonez.  (**-YC-**) Instructions:   Signed by: Jodee Quiñonez 2/24/2025 12:12 PM Dictation workstation:   [id number]       Assessment/Plan      Assessment & Plan  Symptom of congestive heart failure    Acute congestive heart failure, unspecified heart failure type    Eczema    Elevated brain natriuretic peptide (BNP) level    Elevated troponin    Hypoxia    Acute cystitis without hematuria    Iron deficiency anemia    #Elevated BNP  #Elevated troponin  #Hypoxia  -Spouse reports worsening shortness of breath with activity and periorbital edema, worse in the morning and improves throughout the day   -Patient required 2L O2 in the ED, on room air today and requires 2 to 3 liters last night with 02 sat 85 to 88%  - 2/28 still requiring 2L NC   Hx of smoking in the past,  still smokes;  will need overnight trend as well as possible eval for copd  -CTA chest: No acute pulmonary embolism to  the segmental level. 2. Bilateral interstitial edema. 3. Small bilateral pleural effusions with atelectasis/infiltrate,   left-greater-than-right.   -Echo:   1. Left ventricular ejection fraction is normal, by visual estimate at 60-65%.   2. Spectral Doppler shows an abnormal pattern of left ventricular diastolic filling.   3. There is normal right ventricular global systolic function.   4. The left atrium is mildly dilated.  -Trial Lasix 20 mg IV x 1 dose yesterday and repeat today. Did have good results. and monitor output   -Appears euvolemic at this time   -Strict I&Os  -LOS +1420  -Daily weights- 2/28 down 6 kg  -1800 mL daily fluid restriction, 2g Na diet  -Continuous telemetry monitoring   -Cardiology consult, appreciate recs      #UTI, resolved   -UA: trace ketones, 75 leuk esterase  -Urine cx negative   -DC abx  - mild leukocytosis - likely prednisone related      #Eczema   -Present on admission   -Patient brought in Eucerin cream and Aquaphor that she uses at home  -fluocinonide 0.05% cream BID x 5 days  -prednisone 40 mg PO every day x 5 days  -Benadryl 25 mg q6h PRN for itching  -Refer to derm on discharge     #Anemia, iron deficiency   -H/H 7.9/27.4 > 7.5/28.2 > 7.3/25.7   -Has seen hem/onc in the past; last seen in September 2024 (PAYAL Arnold CNP)   -Iron studies: iron 17, 5% saturation, ferritin 16  -Occult stool positive  -No s/s of bleeding noted, continue to monitor  -Consider ACS consult if hgb continues to downtrend with positive occult   - Patient has received Venofer infusions in the past but had difficulty tolerating; had worsening itchiness/rash per hem/onc notes. Pt willing to try today.   -ferrous sulfate every day, increase to bid at discharge   -Hold Lovenox   -Monitor for active bleeding  -Daily CBC   -follow up with hem/onc on discharge- refer to Dr. Pedraza to re-establish care      DVT ppx  -SCDs     PUD ppx  -pantoprazole     F: PRN  E: Replete per protocol  N: Cardiac, 1800ml FR   A:  PIV     Disposition: Pt requires more than 2 inpatient days at this time   Code Status: Full Code               Marbella Fabian, APRN-CNP

## 2025-02-28 NOTE — CONSULTS
Nutrition Diet Education  Provider consult order, Diet education     Visited with the patient and her  at the bedside. The  was very receptive to education and engaged in the discussion. The hospital-to-home nurse had previously visited, and the  was able to accurately recall and explain the information she had provided.    The patient, however, appeared confused and repeatedly forgot why she was in the hospital and why everyone was discussing salt and fluid intake with her. She expressed reluctance to reduce her tea consumption, stating that she drinks tea all day and does not want to limit it to two cups per day, attributing this to her [facility] background. Her , however, demonstrated understanding of the need to cut back and appeared supportive of dietary modifications.    Spent approximately 45 minutes in the room providing education and addressing questions from both the patient and her . Provided contact information for any further questions or follow-up    Education Documentation  Patient specific nutrition education with reference materials provided from the Nutrition Care Manual.  Patient  Acceptance, Explanation/Handout, Needs ReinforcementAndreina Collins RDN, LD at 2/28/2025 11:00  Significant Other  Acceptance, Explanation/Handout, Needs ReinforcementAndreina Collins RDN, LD at 2/28/2025 11:00    Follow Up:     Time Spent (min): 60 minutes

## 2025-02-28 NOTE — CARE PLAN
The clinical goals for the shift include Pt will be free of injury today    Pt is oriented to self and person. No c/o pain. Lungs diminished with wheezes. No cough noted. Pt on 2L NC--pulse ox dips into 80's with exertion and when O2 off. Appetite good. Pt is impulsive and has anxiety.  in to see her twice today. Skin has eczema all over he body--Lidex and benadryl creams applied. Up to toilet with cane and standby assist. Pt hunched over when she walks and is unsteady. Bed alarm on.

## 2025-02-28 NOTE — PROGRESS NOTES
02/28/25 1158   Discharge Planning   Assistance Needed No changes in discharge plan when medically ready. She will reutnr home with her  with no additional services.   Expected Discharge Disposition Home   Intensity of Service   Intensity of Service 0-30 min

## 2025-02-28 NOTE — CARE PLAN
The patient's goals for the shift include      The clinical goals for the shift include Maintain safety, decreased swelling    Alert, confused.  Resting in bed.  Tolerating po intake well without c/o n/v.  HS medications given without difficulty.  Swelling has decreased.   Sleeping intermittently.   Safety maintained.

## 2025-03-01 VITALS
DIASTOLIC BLOOD PRESSURE: 70 MMHG | TEMPERATURE: 97.7 F | SYSTOLIC BLOOD PRESSURE: 124 MMHG | WEIGHT: 139.55 LBS | HEART RATE: 83 BPM | BODY MASS INDEX: 24.73 KG/M2 | RESPIRATION RATE: 17 BRPM | HEIGHT: 63 IN | OXYGEN SATURATION: 96 %

## 2025-03-01 LAB
ANION GAP SERPL CALC-SCNC: 11 MMOL/L (ref 10–20)
BUN SERPL-MCNC: 18 MG/DL (ref 6–23)
CALCIUM SERPL-MCNC: 8.8 MG/DL (ref 8.6–10.3)
CHLORIDE SERPL-SCNC: 105 MMOL/L (ref 98–107)
CO2 SERPL-SCNC: 29 MMOL/L (ref 21–32)
CREAT SERPL-MCNC: 0.74 MG/DL (ref 0.5–1.05)
EGFRCR SERPLBLD CKD-EPI 2021: 80 ML/MIN/1.73M*2
ERYTHROCYTE [DISTWIDTH] IN BLOOD BY AUTOMATED COUNT: 20.1 % (ref 11.5–14.5)
GLUCOSE SERPL-MCNC: 94 MG/DL (ref 74–99)
HCT VFR BLD AUTO: 26 % (ref 36–46)
HGB BLD-MCNC: 7.3 G/DL (ref 12–16)
MCH RBC QN AUTO: 20.6 PG (ref 26–34)
MCHC RBC AUTO-ENTMCNC: 28.1 G/DL (ref 32–36)
MCV RBC AUTO: 73 FL (ref 80–100)
NRBC BLD-RTO: 2.5 /100 WBCS (ref 0–0)
PLATELET # BLD AUTO: 482 X10*3/UL (ref 150–450)
POTASSIUM SERPL-SCNC: 3.6 MMOL/L (ref 3.5–5.3)
RBC # BLD AUTO: 3.55 X10*6/UL (ref 4–5.2)
SODIUM SERPL-SCNC: 141 MMOL/L (ref 136–145)
WBC # BLD AUTO: 12.2 X10*3/UL (ref 4.4–11.3)

## 2025-03-01 PROCEDURE — 85027 COMPLETE CBC AUTOMATED: CPT | Mod: IPSPLIT | Performed by: NURSE PRACTITIONER

## 2025-03-01 PROCEDURE — 2500000001 HC RX 250 WO HCPCS SELF ADMINISTERED DRUGS (ALT 637 FOR MEDICARE OP): Mod: IPSPLIT | Performed by: NURSE PRACTITIONER

## 2025-03-01 PROCEDURE — 2500000004 HC RX 250 GENERAL PHARMACY W/ HCPCS (ALT 636 FOR OP/ED): Mod: IPSPLIT

## 2025-03-01 PROCEDURE — 94760 N-INVAS EAR/PLS OXIMETRY 1: CPT | Mod: IPSPLIT

## 2025-03-01 PROCEDURE — 2500000002 HC RX 250 W HCPCS SELF ADMINISTERED DRUGS (ALT 637 FOR MEDICARE OP, ALT 636 FOR OP/ED): Mod: IPSPLIT | Performed by: NURSE PRACTITIONER

## 2025-03-01 PROCEDURE — 99239 HOSP IP/OBS DSCHRG MGMT >30: CPT

## 2025-03-01 PROCEDURE — 36415 COLL VENOUS BLD VENIPUNCTURE: CPT | Mod: IPSPLIT | Performed by: NURSE PRACTITIONER

## 2025-03-01 PROCEDURE — 2500000001 HC RX 250 WO HCPCS SELF ADMINISTERED DRUGS (ALT 637 FOR MEDICARE OP): Mod: IPSPLIT

## 2025-03-01 PROCEDURE — 80048 BASIC METABOLIC PNL TOTAL CA: CPT | Mod: IPSPLIT | Performed by: NURSE PRACTITIONER

## 2025-03-01 RX ORDER — FERROUS SULFATE 325(65) MG
65 TABLET ORAL 2 TIMES DAILY
Qty: 60 TABLET | Refills: 1 | Status: SHIPPED | OUTPATIENT
Start: 2025-03-01 | End: 2025-04-30

## 2025-03-01 RX ORDER — FUROSEMIDE 20 MG/1
20 TABLET ORAL DAILY
Qty: 30 TABLET | Refills: 1 | Status: SHIPPED | OUTPATIENT
Start: 2025-03-01 | End: 2025-04-30

## 2025-03-01 RX ORDER — SPIRONOLACTONE 25 MG/1
25 TABLET ORAL DAILY
Qty: 30 TABLET | Refills: 1 | Status: SHIPPED | OUTPATIENT
Start: 2025-03-01 | End: 2025-04-30

## 2025-03-01 RX ORDER — PREDNISONE 20 MG/1
40 TABLET ORAL DAILY
Qty: 2 TABLET | Refills: 0 | Status: SHIPPED | OUTPATIENT
Start: 2025-03-01 | End: 2025-03-02

## 2025-03-01 RX ORDER — FLUOCINONIDE 0.5 MG/G
CREAM TOPICAL 2 TIMES DAILY
Qty: 60 G | Refills: 0 | Status: SHIPPED | OUTPATIENT
Start: 2025-03-01 | End: 2025-03-03

## 2025-03-01 RX ADMIN — FUROSEMIDE 20 MG: 20 TABLET ORAL at 09:41

## 2025-03-01 RX ADMIN — SPIRONOLACTONE 25 MG: 25 TABLET, FILM COATED ORAL at 09:41

## 2025-03-01 RX ADMIN — PANTOPRAZOLE SODIUM 40 MG: 40 TABLET, DELAYED RELEASE ORAL at 06:08

## 2025-03-01 RX ADMIN — FERROUS SULFATE TAB 325 MG (65 MG ELEMENTAL FE) 325 MG: 325 (65 FE) TAB at 09:41

## 2025-03-01 RX ADMIN — DOCUSATE SODIUM 100 MG: 100 CAPSULE, LIQUID FILLED ORAL at 09:41

## 2025-03-01 RX ADMIN — PREDNISONE 40 MG: 20 TABLET ORAL at 09:41

## 2025-03-01 ASSESSMENT — COGNITIVE AND FUNCTIONAL STATUS - GENERAL
MOBILITY SCORE: 19
DRESSING REGULAR LOWER BODY CLOTHING: A LITTLE
WALKING IN HOSPITAL ROOM: A LITTLE
HELP NEEDED FOR BATHING: A LITTLE
EATING MEALS: A LITTLE
TURNING FROM BACK TO SIDE WHILE IN FLAT BAD: A LITTLE
STANDING UP FROM CHAIR USING ARMS: A LITTLE
TOILETING: A LITTLE
PERSONAL GROOMING: A LITTLE
DRESSING REGULAR UPPER BODY CLOTHING: A LITTLE
CLIMB 3 TO 5 STEPS WITH RAILING: A LITTLE
DAILY ACTIVITIY SCORE: 18
MOVING TO AND FROM BED TO CHAIR: A LITTLE

## 2025-03-01 ASSESSMENT — PAIN SCALES - WONG BAKER: WONGBAKER_NUMERICALRESPONSE: NO HURT

## 2025-03-01 ASSESSMENT — PAIN SCALES - GENERAL: PAINLEVEL_OUTOF10: 0 - NO PAIN

## 2025-03-01 NOTE — CARE PLAN
The patient's goals for the shift include      The clinical goals for the shift include patient will  be compliant with fluid restriction this shift    Discharge instructions reviewed with patient and family

## 2025-03-01 NOTE — DISCHARGE SUMMARY
Discharge Diagnosis  Symptom of congestive heart failure    Issues Requiring Follow-Up  Follow up with PCP     Discharge Meds     Medication List      START taking these medications     ferrous sulfate tablet; Take 1 tablet (325 mg) by mouth 2 times a day.   fluocinonide 0.05 % cream; Commonly known as: Lidex; Apply topically 2   times a day for 3 doses.   furosemide 20 mg tablet; Commonly known as: Lasix; Take 1 tablet (20 mg)   by mouth once daily.   predniSONE 20 mg tablet; Commonly known as: Deltasone; Take 2 tablets   (40 mg) by mouth once daily for 1 dose.   spironolactone 25 mg tablet; Commonly known as: Aldactone; Take 1 tablet   (25 mg) by mouth once daily.     CONTINUE taking these medications     desoximetasone 0.25 % cream; Commonly known as: Topicort   ibuprofen 200 mg tablet   PRESERVISION AREDS ORAL       Test Results Pending At Discharge  Pending Labs       No current pending labs.            Hospital Course   Nicole Bobo is a 84 y.o. female presenting with reported shortness of breath and facial swelling. Patient is a poor historian but spouse reports that she has been more short of breath with her usual activity lately. He also notes that she has periorbital swelling most mornings that improves throughout the day; this has been going on for about a week. No lip/tongue swelling or difficulty swallowing. Patient has not had any fevers, chills, or cough. She has been eating and drinking normally per spouse. He brought her to the ED for further evaluation. She was placed on 2L O2 via NC for SpO2 87% on room air. Labs were significant for , trop 23, H/H 7.9/27.4. UA with trace ketones, 75 leuk esterase. CTA chest was negative for PE, showed bilateral interstitial edema with left greater than right pleural effusions. Patient was admitted to med/surg with cardiology consulted for further workup.     On exam this morning, patient is forgetful. She says she isn't sure why she's here. She denies  any chest pain, cough, or shortness of breath and is on room air. Her biggest complaint is whole body itchiness due to her chronic eczema; triamcinolone and Benadryl creams added today. Will also start prednisone PO x 5 days.     Hospital Course:  #Elevated BNP  #Elevated troponin  #Hypoxia  -Spouse reports worsening shortness of breath with activity and periorbital edema, worse in the morning and improves throughout the day   -Patient required 2L O2 in the ED, on room air today and requires 2 to 3 liters last night with 02 sat 85 to 88%  - 2/28 still requiring 2L NC   Hx of smoking in the past,  still smokes;  will need overnight trend as well as possible eval for copd  -CTA chest: No acute pulmonary embolism to the segmental level. 2. Bilateral interstitial edema. 3. Small bilateral pleural effusions with atelectasis/infiltrate,   left-greater-than-right.   -Echo:   1. Left ventricular ejection fraction is normal, by visual estimate at 60-65%.   2. Spectral Doppler shows an abnormal pattern of left ventricular diastolic filling.   3. There is normal right ventricular global systolic function.   4. The left atrium is mildly dilated.  -Trial Lasix 20 mg IV x 1 dose yesterday and repeat today. Did have good results. and monitor output   -Appears euvolemic at this time   -Strict I&Os  -LOS +1420  -Daily weights- 2/28 down 6 kg  -1800 mL daily fluid restriction, 2g Na diet  -Continuous telemetry monitoring   -Cardiology consult, appreciate recs      #UTI, resolved   -UA: trace ketones, 75 leuk esterase  -Urine cx negative   -DC abx  - mild leukocytosis - likely prednisone related      #Eczema   -Present on admission   -Patient brought in Eucerin cream and Aquaphor that she uses at home  -fluocinonide 0.05% cream BID x 5 days  -prednisone 40 mg PO every day x 5 days  -Benadryl 25 mg q6h PRN for itching  -Refer to derm on discharge     #Anemia, iron deficiency   -H/H 7.9/27.4 > 7.5/28.2 > 7.3/25.7   -Has seen  hem/onc in the past; last seen in September 2024 (PAYAL Arnold CNP)   -Iron studies: iron 17, 5% saturation, ferritin 16  -Occult stool positive  -No s/s of bleeding noted, continue to monitor  -Consider ACS consult if hgb continues to downtrend with positive occult   - Patient has received Venofer infusions in the past but had difficulty tolerating; had worsening itchiness/rash per hem/onc notes. Pt willing to try today.   -ferrous sulfate every day, increase to bid at discharge   -Hold Lovenox   -Monitor for active bleeding  -Daily CBC   -follow up with hem/onc on discharge- refer to Dr. Pedraza to re-establish care      DVT ppx  -SCDs     PUD ppx  -pantoprazole     F: PRN  E: Replete per protocol  N: Cardiac, 1800ml FR   A: PIV     Disposition: Pt stable for discharge home. Qualified for 2L at HS. Started on BID ferrous sulfate. Will follow up with heme/onc.    Code Status: Full Code     Total cumulative time spent in preparation of this discharge including documentation review, coordination of care with the medical team including PT/SW/care coordinators and treating consultants, discussion with patient and pertinent family members and finalization of prescriptions, followup appointments and this discharge summary was approximately  45 minutes. Over 50% of the time was spent face-to-face counseling the patient on diagnosis, prescriptions, and follow up appointments.     Pertinent Physical Exam At Time of Discharge  Physical Exam  Vitals reviewed.   HENT:      Head: Normocephalic and atraumatic.      Right Ear: External ear normal.      Left Ear: External ear normal.      Nose: Nose normal.      Mouth/Throat:      Pharynx: Oropharynx is clear.   Eyes:      Conjunctiva/sclera: Conjunctivae normal.   Cardiovascular:      Rate and Rhythm: Normal rate and regular rhythm.      Pulses: Normal pulses.      Heart sounds: Normal heart sounds.   Pulmonary:      Breath sounds: Decreased breath sounds present.   Abdominal:       General: Bowel sounds are normal.      Palpations: Abdomen is soft.   Musculoskeletal:         General: Normal range of motion.      Cervical back: Normal range of motion and neck supple.   Skin:     General: Skin is dry.   Neurological:      General: No focal deficit present.      Mental Status: She is alert. She is disoriented.      Comments: Forgetful    Psychiatric:         Mood and Affect: Mood normal.         Behavior: Behavior normal.         Outpatient Follow-Up  Future Appointments   Date Time Provider Department Center   5/21/2025  2:30 PM Addis Cardona PA-C [id number]         FLORA Alcaraz-CNP

## 2025-03-01 NOTE — CARE PLAN
Oxygen set up [facility] 2 LPM HS. Called and faxed to [facility]. Phone number left with patient

## 2025-03-01 NOTE — CARE PLAN
The patient's goals for the shift include      The clinical goals for the shift include patient will be free from falls overnight    The patient had an uneventful evening was able to rest most of the night with no complaints, vss and she remained free of falls / injury

## 2025-03-03 ENCOUNTER — TELEPHONE (OUTPATIENT)
Dept: HEMATOLOGY/ONCOLOGY | Facility: HOSPITAL | Age: 85
End: 2025-03-03
Payer: MEDICARE

## 2025-03-03 DIAGNOSIS — R91.1 NODULE OF MIDDLE LOBE OF RIGHT LUNG: Primary | ICD-10-CM

## 2025-03-03 NOTE — TELEPHONE ENCOUNTER
Referral placed to [facility] by Carol Fountain NP, [facility] ED, for 7 mm RML lung nodule, discovered incidentally on CT angio chest imaging 2/24/25. I called Ms. Bobo, who was napping at the time. Her  requested I call back tomorrow at 0900 to explain the referral and next steps, which I will do.  FLORA Morrow.NIA  [facility]

## 2025-03-04 NOTE — TELEPHONE ENCOUNTER
I connected w/ Nicole and Kendall & discussed Nicole's incidental lung nodule finding. We reviewed the need for additional surveillance of this nodule to ensure stability over time. I provided options for follow-up: lung nodule clinic referral, pulmonology JORGE referral, or PCP follow-up. They prefer to follow-up w/ PCP Dr. Sosa, and Nicole has a follow-up appt scheduled in May. They will discuss the finding w/ Dr Sosa at the appt. All questions answered.  FLORA Morrow.CNP  [facility]

## 2025-03-07 ENCOUNTER — PATIENT OUTREACH (OUTPATIENT)
Dept: CARE COORDINATION | Facility: CLINIC | Age: 85
End: 2025-03-07
Payer: MEDICARE

## 2025-03-07 SDOH — ECONOMIC STABILITY: FOOD INSECURITY
ARE ANY OF YOUR NEEDS URGENT? FOR EXAMPLE, UNCERTAINTY OF WHERE YOU WILL GET YOUR NEXT MEAL OR NOT HAVING THE MEDICATIONS YOU NEED TO TAKE TOMORROW.: NO

## 2025-03-07 SDOH — ECONOMIC STABILITY: GENERAL: WOULD YOU LIKE HELP WITH ANY OF THE FOLLOWING NEEDS?: I DONT NEED HELP WITH ANY OF THESE

## 2025-03-07 NOTE — PROGRESS NOTES
Inpatient Facility: [facility]  Admission: 2/24/2025  Discharge:  3/1/2025  Discharge Disposition:  CHF    Outreach completed.  This CM spoke with patient  who states patient was currently laying down and resting.  Patient  states patient Sob, has decreased, patient able to complete ADLs, uses O2 2L via NC at night, decreased swelling.   has spoke with patient cardiologist yesterday and follow up appointment is scheduled.  Reviewed discharge medications with .  No needs or concerns at this time.  CM will continue to follow.      Wrap Up  Wrap Up Additional Comments: CM will continue to outrach (3/7/2025 11:47 AM)  Call End Time: 1145 (3/7/2025 11:47 AM)    Engagement  Call Start Time: 1140 (3/7/2025 11:47 AM)    Medications  Medications reviewed with patient/caregiver?: Yes (3/7/2025 11:47 AM)  Is the patient having any side effects they believe may be caused by any medication additions or changes?: No (3/7/2025 11:47 AM)  Does the patient have all medications ordered at discharge?: Yes (3/7/2025 11:47 AM)  Care Management Interventions: No intervention needed (3/7/2025 11:47 AM)    Appointments  Does the patient have a primary care provider?: Yes (3/7/2025 11:47 AM)  Care Management Interventions: Verified appointment date/time/provider (3/7/2025 11:47 AM)  Has the patient kept scheduled appointments due by today?: Yes (3/7/2025 11:47 AM)  Care Management Interventions: Advised patient to keep appointment (3/7/2025 11:47 AM)    Self Management  What is the home health agency?: None (3/7/2025 11:47 AM)  What Durable Medical Equipment (DME) was ordered?: None (3/7/2025 11:47 AM)    Patient Teaching  Does the patient have access to their discharge instructions?: Yes (3/7/2025 11:47 AM)  Care Management Interventions: Reviewed instructions with patient (3/7/2025 11:47 AM)  What is the patient's perception of their health status since discharge?: Improving (3/7/2025 11:47  AM)  Is the patient/caregiver able to teach back the hierarchy of who to call/visit for symptoms/problems? PCP, Specialist, Home Health nurse, Urgent Care, ED, 911: Yes (3/7/2025 11:47 AM)      FOSTER Fitzgerald, RN   [phone]   ACO Department

## 2025-03-07 NOTE — SIGNIFICANT EVENT
03/07/25 1150   Social Determinants of Health- Help Requested   Would you like help with any of the following needs? I dont need help with any of these   Are any of your needs urgent? For example, uncertainty of where you will get your next meal or not having the medications you need to take tomorrow. N

## 2025-03-11 ENCOUNTER — CLINICAL SUPPORT (OUTPATIENT)
Dept: NUTRITION | Facility: HOSPITAL | Age: 85
End: 2025-03-11
Payer: MEDICARE

## 2025-03-11 ENCOUNTER — TELEPHONE (OUTPATIENT)
Dept: ADMINISTRATIVE | Facility: CLINIC | Age: 85
End: 2025-03-11
Payer: MEDICARE

## 2025-03-11 DIAGNOSIS — R63.4 UNEXPLAINED WEIGHT LOSS: ICD-10-CM

## 2025-03-11 DIAGNOSIS — E61.1 IRON DEFICIENCY: Chronic | ICD-10-CM

## 2025-03-11 NOTE — PROGRESS NOTES
Food For Life  Diet Recommendation 1: Heart Healthy  Diet Recommendation 2: MyPlate  Diet Recommendation 3: Healthy Eating  Food Intolerance Avoidance: NKFA  Nutrition Goals Stated: Follow low sodium diet. Pt  helps watch sodium content of foods.  Household Size: 2 Family Members  Interventions: Referral Number: 1st 6 Mo Referral 6 Mos  Interventions: Visit Number: 1 of 6 Visits - Max 6 Visits/Referral Each 6 Mo Period  Other Interventions: [facility] Food Resources  Education Today: DASH Diet (NN[facility] Healthy Tips, MyPlate)  Grains: 50-75% Whole  Fruit: 50-75% Fresh  Vegetables: Fresh - 100%  Proteins: 0 Plant-based Items  Dairy: 25-50% Lowfat  Originating Site of Referral Order: Sheree Benavides  Initials of RD Assisting Today: SCOT

## 2025-03-11 NOTE — TELEPHONE ENCOUNTER
Patient reports no edema to lower extremities, drinking 7 cups of fluid a day and feeling good. WINSTON ChengN, [facility] RN.

## 2025-04-07 ENCOUNTER — APPOINTMENT (OUTPATIENT)
Dept: CARDIOLOGY | Facility: CLINIC | Age: 85
End: 2025-04-07
Payer: MEDICARE

## 2025-04-07 VITALS
SYSTOLIC BLOOD PRESSURE: 134 MMHG | HEIGHT: 61 IN | OXYGEN SATURATION: 97 % | WEIGHT: 135 LBS | HEART RATE: 72 BPM | BODY MASS INDEX: 25.49 KG/M2 | DIASTOLIC BLOOD PRESSURE: 65 MMHG

## 2025-04-07 DIAGNOSIS — I50.32 CHRONIC HEART FAILURE WITH PRESERVED EJECTION FRACTION (HFPEF): Primary | ICD-10-CM

## 2025-04-07 DIAGNOSIS — E78.5 HYPERLIPIDEMIA, UNSPECIFIED HYPERLIPIDEMIA TYPE: ICD-10-CM

## 2025-04-07 DIAGNOSIS — D50.9 IRON DEFICIENCY ANEMIA, UNSPECIFIED IRON DEFICIENCY ANEMIA TYPE: ICD-10-CM

## 2025-04-07 PROCEDURE — 99214 OFFICE O/P EST MOD 30 MIN: CPT | Performed by: NURSE PRACTITIONER

## 2025-04-07 PROCEDURE — 1160F RVW MEDS BY RX/DR IN RCRD: CPT | Performed by: NURSE PRACTITIONER

## 2025-04-07 PROCEDURE — 1159F MED LIST DOCD IN RCRD: CPT | Performed by: NURSE PRACTITIONER

## 2025-04-07 PROCEDURE — G2211 COMPLEX E/M VISIT ADD ON: HCPCS | Performed by: NURSE PRACTITIONER

## 2025-04-07 NOTE — PROGRESS NOTES
Primary Care Physician: Aliyah Sosa DO      Date of Visit: 04/07/2025 11:30 AM EDT  Location of visit:  [facility]   Type of Visit: Follow up             Chief Complaint   Patient presents with    Hospital Follow-up     Hospital follow up CHF        HPI / Summary:   Nicole Bobo is a 84 y.o. female  with chronic iron deficiency anemia, memory issues who presents for hospital follow up     Hospitalized with new onset HFpEF with volume overload   She is feeling much better and continues on daily furosemide and spironolactone   Only complaint is chronic skin issue,  which is not new since starting Rx       12 system review is negative except as noted above  Accompanied by her  who contributed to the history       Medical History:   Past Medical History:   Diagnosis Date    Anesthesia of skin 08/08/2017    Arm numbness    Other constipation 09/23/2021    Chronic constipation    Pain in left hip 06/08/2016    Hip pain, acute, left    Personal history of other diseases of the respiratory system 11/04/2014    History of sinusitis    Personal history of pneumonia (recurrent) 11/04/2014    History of pneumonia    Unspecified cataract 02/13/2019    Cataracts, bilateral       Social History:   Tobacco Use: Low Risk  (4/7/2025)    Patient History     Smoking Tobacco Use: Never     Smokeless Tobacco Use: Never     Passive Exposure: Not on file         MEDICATIONS:   Current Outpatient Medications   Medication Instructions    desoximetasone (Topicort) 0.25 % cream APPLY ONE APPLICATION TOPICALLY TO AFFECTED AREA DAILY    ferrous sulfate 325 mg, oral, 2 times daily    furosemide (LASIX) 20 mg, oral, Daily    ibuprofen 600 mg, Daily PRN    spironolactone (ALDACTONE) 25 mg, oral, Daily    vit A/vit C/vit E/zinc/copper (PRESERVISION AREDS ORAL) 1 tablet, 2 times daily         IMAGING REPORTS INDEPENDENTLY REVIEWED:     Echocardiogram 2/25/2025  CONCLUSIONS:   1. Left ventricular ejection fraction is normal, by  "visual estimate at 60-65%.   2. Spectral Doppler shows an abnormal pattern of left ventricular diastolic filling.   3. There is normal right ventricular global systolic function.   4. The left atrium is mildly dilated.      LABS:    CBC with Differential:    Lab Results   Component Value Date    WBC 12.2 (H) 03/01/2025    RBC 3.55 (L) 03/01/2025    HGB 7.3 (L) 03/01/2025    HCT 26.0 (L) 03/01/2025     (H) 03/01/2025    MCV 73 (L) 03/01/2025    MCH 20.6 (L) 03/01/2025    MCHC 28.1 (L) 03/01/2025    RDW 20.1 (H) 03/01/2025    NRBC 2.5 (H) 03/01/2025    LYMPHOPCT 12.9 02/24/2025    MONOPCT 8.4 02/24/2025    EOSPCT 11.5 02/24/2025    BASOPCT 0.8 02/24/2025    MONOSABS 0.64 02/24/2025    LYMPHSABS 0.99 02/24/2025    EOSABS 0.88 (H) 02/24/2025    BASOSABS 0.06 02/24/2025     CMP:    Lab Results   Component Value Date     03/01/2025    K 3.6 03/01/2025     03/01/2025    CO2 29 03/01/2025    BUN 18 03/01/2025    CREATININE 0.74 03/01/2025    GLUCOSE 94 03/01/2025    PROT 6.3 (L) 02/24/2025    CALCIUM 8.8 03/01/2025    BILITOT 0.5 02/24/2025    ALKPHOS 97 02/24/2025    AST 19 02/24/2025    ALT 10 02/24/2025     BMP:    Lab Results   Component Value Date     03/01/2025    K 3.6 03/01/2025     03/01/2025    CO2 29 03/01/2025    BUN 18 03/01/2025    CREATININE 0.74 03/01/2025    CALCIUM 8.8 03/01/2025    GLUCOSE 94 03/01/2025     Magnesium:  Lab Results   Component Value Date    MG 2.18 02/24/2025     Troponin:    Lab Results   Component Value Date    TROPHS 19 (H) 02/25/2025    TROPHS 23 (H) 02/24/2025     BNP:   Lab Results   Component Value Date     (H) 02/24/2025         Lipid Panel:  Lab Results   Component Value Date    HDL 58.4 08/30/2024    CHHDL 3.1 08/30/2024    VLDL 14 08/30/2024    TRIG 68 08/30/2024    NHDL 122 08/30/2024        Lab work and imaging results independently reviewed by me     Visit Vitals  /65   Pulse 72   Ht 1.549 m (5' 1\")   Wt 61.2 kg (135 lb)   SpO2 97% "   BMI 25.51 kg/m²   OB Status Postmenopausal   Smoking Status Never   BSA 1.62 m²           Constitutional:       Appearance: Healthy appearance. Not in distress.   Eyes:      Conjunctiva/sclera: Conjunctivae normal.   Neck:      Vascular: JVD normal.   Pulmonary:      Effort: Pulmonary effort is normal.      Breath sounds: Normal breath sounds.   Cardiovascular:      PMI at left midclavicular line. Normal rate. Regular rhythm. Normal S1. Normal S2.       Murmurs: There is no murmur.      No rub.   Pulses:     Intact distal pulses.   Edema:     Peripheral edema absent.   Abdominal:      General: Bowel sounds are normal.   Musculoskeletal:      Cervical back: Neck supple. Skin:     General: Skin is warm and dry.   Neurological:      Mental Status: Alert and oriented to person, place and time.           Problem List Items Addressed This Visit             ICD-10-CM    RESOLVED: Hyperlipidemia E78.5    Iron deficiency anemia D50.9    Chronic heart failure with preserved ejection fraction (HFpEF) - Primary I50.32       Mrs. Bobo is doing well after her recent hospitalization.   Today, she is clinically euvolemic,  NYHA class II   --Continue Furosemide 20 mg daily   --Continue spironolactone   --Check [facility]          04/15/25 at 11:56 AM - GRETA Eaton    Follow up cardiology in 6 months;  call sooner if problems arise    Routine follow up with your new primary care provider     Orders:  No orders of the defined types were placed in this encounter.        Followup Appts:  Future Appointments   Date Time Provider Department Center   5/14/2025  3:00 PM [facility] ELYSSA [facility]   5/21/2025  2:30 PM Addis Cardona PA-C [id number]   8/12/2025 10:30 AM GRETA Eaton [id number] [place]

## 2025-04-09 ENCOUNTER — CLINICAL SUPPORT (OUTPATIENT)
Dept: NUTRITION | Facility: HOSPITAL | Age: 85
End: 2025-04-09
Payer: MEDICARE

## 2025-04-09 NOTE — PROGRESS NOTES
Food For Life  Diet Recommendation 1: Heart Healthy  Diet Recommendation 2: MyPlate  Diet Recommendation 3: Healthy Eating  Food Intolerance Avoidance: NKFA  Nutrition Goals Stated: Follow low sodium diet. Pt  helps watch sodium content of foods.  Household Size: 2 Family Members  Interventions: Referral Number: 1st 6 Mo Referral 6 Mos  Interventions: Visit Number: 2 of 6 Visits - Max 6 Visits/Referral Each 6 Mo Period  Other Interventions: Pt and  grow small garden for frush veggies.  Education Today: DASH Diet  Follow Up Notes for Future Visits: Pt and family liked fresh green beans  Grains: 25-50% Whole  Fruit: 25-50% Fresh  Vegetables: Fresh - 100%  Proteins: 1-2 Plant-based Items  Dairy: Lowfat - 100%  Originating Site of Referral Order: Sheree Benavides  Initials of RD Assisting Today: SCOT

## 2025-04-10 ENCOUNTER — PATIENT OUTREACH (OUTPATIENT)
Dept: CARE COORDINATION | Facility: CLINIC | Age: 85
End: 2025-04-10
Payer: MEDICARE

## 2025-04-10 NOTE — PROGRESS NOTES
Outreach call to patient to check in 30 days after hospital discharge to support smooth transition of care.  Patient with no additional needs noted. No additional outreach needed at this time.  CM will dis enroll patient from [facility]ARY program.     WINSTON FitzgeraldN, RN   [phone]   ACO Department

## 2025-04-11 ENCOUNTER — APPOINTMENT (OUTPATIENT)
Dept: SLEEP MEDICINE | Facility: CLINIC | Age: 85
End: 2025-04-11
Payer: MEDICARE

## 2025-04-15 PROBLEM — E78.5 HYPERLIPIDEMIA: Status: RESOLVED | Noted: 2023-07-07 | Resolved: 2025-04-15

## 2025-04-25 LAB
ANION GAP SERPL CALCULATED.4IONS-SCNC: 10 MMOL/L (CALC) (ref 7–17)
BUN SERPL-MCNC: 14 MG/DL (ref 7–25)
BUN/CREAT SERPL: NORMAL (CALC) (ref 6–22)
CALCIUM SERPL-MCNC: 9.4 MG/DL (ref 8.6–10.4)
CHLORIDE SERPL-SCNC: 105 MMOL/L (ref 98–110)
CO2 SERPL-SCNC: 28 MMOL/L (ref 20–32)
CREAT SERPL-MCNC: 0.8 MG/DL (ref 0.6–0.95)
EGFRCR SERPLBLD CKD-EPI 2021: 73 ML/MIN/1.73M2
GLUCOSE SERPL-MCNC: 90 MG/DL (ref 65–139)
POTASSIUM SERPL-SCNC: 4.3 MMOL/L (ref 3.5–5.3)
SODIUM SERPL-SCNC: 143 MMOL/L (ref 135–146)

## 2025-05-01 DIAGNOSIS — I50.9 ACUTE CONGESTIVE HEART FAILURE, UNSPECIFIED HEART FAILURE TYPE: ICD-10-CM

## 2025-05-01 DIAGNOSIS — E61.1 IRON DEFICIENCY: Chronic | ICD-10-CM

## 2025-05-01 RX ORDER — SPIRONOLACTONE 25 MG/1
25 TABLET ORAL DAILY
Qty: 90 TABLET | Refills: 1 | Status: SHIPPED | OUTPATIENT
Start: 2025-05-01 | End: 2025-06-30

## 2025-05-01 RX ORDER — FUROSEMIDE 20 MG/1
20 TABLET ORAL DAILY
Qty: 90 TABLET | Refills: 1 | Status: SHIPPED | OUTPATIENT
Start: 2025-05-01 | End: 2025-06-30

## 2025-05-01 RX ORDER — FERROUS SULFATE 325(65) MG
65 TABLET ORAL 2 TIMES DAILY
Qty: 120 TABLET | Refills: 0 | Status: SHIPPED | OUTPATIENT
Start: 2025-05-01 | End: 2025-06-30

## 2025-05-14 ENCOUNTER — CLINICAL SUPPORT (OUTPATIENT)
Dept: NUTRITION | Facility: HOSPITAL | Age: 85
End: 2025-05-14
Payer: MEDICARE

## 2025-05-14 NOTE — PROGRESS NOTES
Food For Life  Diet Recommendation 1: Heart Healthy  Diet Recommendation 2: MyPlate  Diet Recommendation 3: Healthy Eating  Food Intolerance Avoidance: NKFA  Nutrition Goals Stated: Follow low sodium diet. Pt  helps watch sodium content of foods.  Household Size: 2 Family Members  Interventions: Referral Number: 1st 6 Mo Referral 6 Mos  Interventions: Visit Number: 3 of 6 Visits - Max 6 Visits/Referral Each 6 Mo Period  Other Interventions: Pt and  grow small garden for frush veggies.  Education Today: MyPlate Meals  Grains: 50-75% Whole  Fruit: 50-75% Fresh  Vegetables: 25-50% Fresh  Proteins: 0 Plant-based Items  Dairy: 25-50% Lowfat  Originating Site of Referral Order: Sheree Benavides  Initials of RD Assisting Today: SCOT

## 2025-05-21 ENCOUNTER — APPOINTMENT (OUTPATIENT)
Dept: PRIMARY CARE | Facility: CLINIC | Age: 85
End: 2025-05-21
Payer: MEDICARE

## 2025-06-11 ENCOUNTER — CLINICAL SUPPORT (OUTPATIENT)
Dept: NUTRITION | Facility: HOSPITAL | Age: 85
End: 2025-06-11
Payer: MEDICARE

## 2025-06-11 NOTE — PROGRESS NOTES
Food For Life  Diet Recommendation 1: Heart Healthy  Diet Recommendation 2: MyPlate  Diet Recommendation 3: Healthy Eating  Food Intolerance Avoidance: NKFA  Nutrition Goals Stated: Follow low sodium diet. Pt  helps watch sodium content of foods.  Household Size: 2 Family Members  Interventions: Referral Number: 1st 6 Mo Referral 6 Mos  Interventions: Visit Number: 4 of 6 Visits - Max 6 Visits/Referral Each 6 Mo Period  Other Interventions: Pt and  grow small garden for frush veggies.  Education Today: MyPlate Meals  Grains: 25-50% Whole  Fruit: 50-75% Fresh  Vegetables: 50-75% Fresh  Proteins: 1-2 Plant-based Items  Dairy: 0-25% Lowfat  Originating Site of Referral Order: Sheree Benavides  Initials of RD Assisting Today: SCOT

## 2025-07-02 ENCOUNTER — APPOINTMENT (OUTPATIENT)
Dept: RADIOLOGY | Facility: HOSPITAL | Age: 85
End: 2025-07-02
Payer: MEDICARE

## 2025-07-02 ENCOUNTER — HOSPITAL ENCOUNTER (EMERGENCY)
Facility: HOSPITAL | Age: 85
Discharge: HOME | End: 2025-07-02
Payer: MEDICARE

## 2025-07-02 ENCOUNTER — OFFICE VISIT (OUTPATIENT)
Dept: URGENT CARE | Age: 85
End: 2025-07-02
Payer: MEDICARE

## 2025-07-02 VITALS
OXYGEN SATURATION: 95 % | TEMPERATURE: 97.9 F | HEART RATE: 77 BPM | BODY MASS INDEX: 24.8 KG/M2 | RESPIRATION RATE: 19 BRPM | WEIGHT: 140 LBS | DIASTOLIC BLOOD PRESSURE: 68 MMHG | HEIGHT: 63 IN | SYSTOLIC BLOOD PRESSURE: 138 MMHG

## 2025-07-02 VITALS
RESPIRATION RATE: 16 BRPM | SYSTOLIC BLOOD PRESSURE: 147 MMHG | HEIGHT: 63 IN | TEMPERATURE: 97.3 F | HEART RATE: 76 BPM | DIASTOLIC BLOOD PRESSURE: 71 MMHG | OXYGEN SATURATION: 95 % | WEIGHT: 133 LBS | BODY MASS INDEX: 23.57 KG/M2

## 2025-07-02 DIAGNOSIS — L03.116 CELLULITIS OF LEFT LOWER EXTREMITY: Primary | ICD-10-CM

## 2025-07-02 LAB
ALBUMIN SERPL BCP-MCNC: 4.1 G/DL (ref 3.4–5)
ALP SERPL-CCNC: 113 U/L (ref 33–136)
ALT SERPL W P-5'-P-CCNC: 11 U/L (ref 7–45)
ANION GAP SERPL CALC-SCNC: 18 MMOL/L (ref 10–20)
AST SERPL W P-5'-P-CCNC: 21 U/L (ref 9–39)
BASOPHILS # BLD AUTO: 0.1 X10*3/UL (ref 0–0.1)
BASOPHILS NFR BLD AUTO: 1.1 %
BILIRUB SERPL-MCNC: 0.4 MG/DL (ref 0–1.2)
BNP SERPL-MCNC: 77 PG/ML (ref 0–99)
BUN SERPL-MCNC: 22 MG/DL (ref 6–23)
CALCIUM SERPL-MCNC: 10 MG/DL (ref 8.6–10.3)
CARDIAC TROPONIN I PNL SERPL HS: 14 NG/L (ref 0–13)
CHLORIDE SERPL-SCNC: 102 MMOL/L (ref 98–107)
CO2 SERPL-SCNC: 23 MMOL/L (ref 21–32)
CREAT SERPL-MCNC: 1.15 MG/DL (ref 0.5–1.05)
EGFRCR SERPLBLD CKD-EPI 2021: 47 ML/MIN/1.73M*2
EOSINOPHIL # BLD AUTO: 0.81 X10*3/UL (ref 0–0.4)
EOSINOPHIL NFR BLD AUTO: 8.7 %
ERYTHROCYTE [DISTWIDTH] IN BLOOD BY AUTOMATED COUNT: 16.2 % (ref 11.5–14.5)
GLUCOSE SERPL-MCNC: 197 MG/DL (ref 74–99)
HCT VFR BLD AUTO: 49.6 % (ref 36–46)
HGB BLD-MCNC: 15.6 G/DL (ref 12–16)
IMM GRANULOCYTES # BLD AUTO: 0.04 X10*3/UL (ref 0–0.5)
IMM GRANULOCYTES NFR BLD AUTO: 0.4 % (ref 0–0.9)
LACTATE SERPL-SCNC: 2.8 MMOL/L (ref 0.4–2)
LYMPHOCYTES # BLD AUTO: 1.08 X10*3/UL (ref 0.8–3)
LYMPHOCYTES NFR BLD AUTO: 11.7 %
MCH RBC QN AUTO: 26.6 PG (ref 26–34)
MCHC RBC AUTO-ENTMCNC: 31.5 G/DL (ref 32–36)
MCV RBC AUTO: 85 FL (ref 80–100)
MONOCYTES # BLD AUTO: 0.53 X10*3/UL (ref 0.05–0.8)
MONOCYTES NFR BLD AUTO: 5.7 %
NEUTROPHILS # BLD AUTO: 6.7 X10*3/UL (ref 1.6–5.5)
NEUTROPHILS NFR BLD AUTO: 72.4 %
NRBC BLD-RTO: 0 /100 WBCS (ref 0–0)
PLATELET # BLD AUTO: 453 X10*3/UL (ref 150–450)
POTASSIUM SERPL-SCNC: 3.6 MMOL/L (ref 3.5–5.3)
PROT SERPL-MCNC: 7.5 G/DL (ref 6.4–8.2)
RBC # BLD AUTO: 5.86 X10*6/UL (ref 4–5.2)
SODIUM SERPL-SCNC: 139 MMOL/L (ref 136–145)
WBC # BLD AUTO: 9.3 X10*3/UL (ref 4.4–11.3)

## 2025-07-02 PROCEDURE — 85025 COMPLETE CBC W/AUTO DIFF WBC: CPT | Performed by: PHYSICIAN ASSISTANT

## 2025-07-02 PROCEDURE — 83880 ASSAY OF NATRIURETIC PEPTIDE: CPT | Performed by: PHYSICIAN ASSISTANT

## 2025-07-02 PROCEDURE — 4500999001 HC ED NO CHARGE

## 2025-07-02 PROCEDURE — 80053 COMPREHEN METABOLIC PANEL: CPT | Performed by: PHYSICIAN ASSISTANT

## 2025-07-02 PROCEDURE — 83605 ASSAY OF LACTIC ACID: CPT | Performed by: PHYSICIAN ASSISTANT

## 2025-07-02 PROCEDURE — 36415 COLL VENOUS BLD VENIPUNCTURE: CPT | Performed by: PHYSICIAN ASSISTANT

## 2025-07-02 PROCEDURE — 99283 EMERGENCY DEPT VISIT LOW MDM: CPT

## 2025-07-02 PROCEDURE — 84484 ASSAY OF TROPONIN QUANT: CPT | Performed by: PHYSICIAN ASSISTANT

## 2025-07-02 ASSESSMENT — ENCOUNTER SYMPTOMS
COLOR CHANGE: 1
WOUND: 1

## 2025-07-02 ASSESSMENT — PATIENT HEALTH QUESTIONNAIRE - PHQ9
2. FEELING DOWN, DEPRESSED OR HOPELESS: NOT AT ALL
1. LITTLE INTEREST OR PLEASURE IN DOING THINGS: NOT AT ALL
SUM OF ALL RESPONSES TO PHQ9 QUESTIONS 1 & 2: 0

## 2025-07-02 ASSESSMENT — PAIN SCALES - GENERAL: PAINLEVEL_OUTOF10: 0 - NO PAIN

## 2025-07-02 ASSESSMENT — PAIN - FUNCTIONAL ASSESSMENT: PAIN_FUNCTIONAL_ASSESSMENT: 0-10

## 2025-07-02 NOTE — ED TRIAGE NOTES
Pt ambulatory to ED from [facility] c/o redness in her extremities, pt has a wound on her L foot as well, denies any pain

## 2025-07-02 NOTE — PROGRESS NOTES
"Subjective   Patient ID: Nicole Bobo is a 84 y.o. female. They present today with a chief complaint of Blister (Bluster / Bumps on inside of LEFT ankle x 1 month. PT has very red bilateral ankles. HX of eczema.).    History of Present Illness  HPI    Past Medical History  Allergies as of 07/02/2025 - Reviewed 07/02/2025   Allergen Reaction Noted    Animal dander Unknown 08/25/2023    Dog dander Other 07/13/2023       Prescriptions Prior to Admission[1]     Medical History[2]    Surgical History[3]     reports that she has never smoked. She has never used smokeless tobacco. She reports that she does not drink alcohol and does not use drugs.    Review of Systems  Review of Systems   Skin:  Positive for color change and wound.                                  Objective    Vitals:    07/02/25 1656   BP: 147/71   Pulse: 76   Resp: 16   Temp: 36.3 °C (97.3 °F)   TempSrc: Oral   SpO2: 93%   Weight: 60.3 kg (133 lb)   Height: 1.6 m (5' 3\")     No LMP recorded. Patient is postmenopausal.    Physical Exam  Vitals reviewed.   Constitutional:       Appearance: Normal appearance.   HENT:      Head: Normocephalic and atraumatic.   Cardiovascular:      Rate and Rhythm: Normal rate and regular rhythm.      Pulses: Normal pulses.      Heart sounds: Normal heart sounds.   Pulmonary:      Effort: Pulmonary effort is normal.      Breath sounds: Normal breath sounds.   Musculoskeletal:         General: Tenderness present.      Cervical back: Normal range of motion.   Skin:     Capillary Refill: Capillary refill takes less than 2 seconds.      Findings: Erythema present.   Neurological:      General: No focal deficit present.      Mental Status: She is alert and oriented to person, place, and time.   Psychiatric:         Mood and Affect: Mood normal.         Behavior: Behavior normal.         Procedures    Point of Care Test & Imaging Results from this visit  No results found for this visit on 07/02/25.   Imaging  No results " found.    Cardiology, Vascular, and Other Imaging  No other imaging results found for the past 2 days      Diagnostic study results (if any) were reviewed by [facility].    Assessment/Plan   Allergies, medications, history, and pertinent labs/EKGs/Imaging reviewed by FLORA Barnes-CNP.     Medical Decision Making  84-year-old female presents with swelling and erythema left foot she has history of eczema but she also has for about a month increasing swelling and redness in her foot on exam left foot is erythema weeping.  There are couple larger nodules near her ankle.  All the way up bilateral calves is hard and firm red.  Patient does not have a primary care doctor patient saw dermatology years ago.  Patient is sent to the emergency room for further management possible labs possible imaging of her lower extremities.  Possible IV antibiotics.  Patient agrees with plan of care  is driving her down to the emergency room patient left in stable condition vital signs are stable.    Orders and Diagnoses  Diagnoses and all orders for this visit:  Cellulitis of left lower extremity      Medical Admin Record      Patient disposition: ED    Electronically signed by [facility]  5:02 PM           [1] (Not in a hospital admission)  [2]   Past Medical History:  Diagnosis Date    Anesthesia of skin 08/08/2017    Arm numbness    Other constipation 09/23/2021    Chronic constipation    Pain in left hip 06/08/2016    Hip pain, acute, left    Personal history of other diseases of the respiratory system 11/04/2014    History of sinusitis    Personal history of pneumonia (recurrent) 11/04/2014    History of pneumonia    Unspecified cataract 02/13/2019    Cataracts, bilateral   [3]   Past Surgical History:  Procedure Laterality Date    APPENDECTOMY  01/20/2014    Appendectomy    TONSILLECTOMY  01/20/2014    Tonsillectomy

## 2025-07-10 DIAGNOSIS — E61.1 IRON DEFICIENCY: Primary | Chronic | ICD-10-CM

## 2025-07-10 RX ORDER — FERROUS SULFATE 325(65) MG
1 TABLET ORAL 2 TIMES DAILY
Qty: 120 TABLET | Refills: 0 | Status: SHIPPED | OUTPATIENT
Start: 2025-07-10

## 2025-07-10 RX ORDER — NEOMYCIN SULFATE, POLYMYXIN B SULFATE, AND DEXAMETHASONE 3.5; 10000; 1 MG/G; [USP'U]/G; MG/G
OINTMENT OPHTHALMIC
COMMUNITY
Start: 2025-07-09

## 2025-07-16 ENCOUNTER — APPOINTMENT (OUTPATIENT)
Dept: NUTRITION | Facility: HOSPITAL | Age: 85
End: 2025-07-16
Payer: MEDICARE

## 2025-07-27 ENCOUNTER — HOSPITAL ENCOUNTER (EMERGENCY)
Facility: HOSPITAL | Age: 85
Discharge: HOME | End: 2025-07-27
Attending: EMERGENCY MEDICINE
Payer: MEDICARE

## 2025-07-27 VITALS
OXYGEN SATURATION: 95 % | RESPIRATION RATE: 20 BRPM | HEART RATE: 90 BPM | TEMPERATURE: 97.7 F | DIASTOLIC BLOOD PRESSURE: 69 MMHG | BODY MASS INDEX: 24.8 KG/M2 | SYSTOLIC BLOOD PRESSURE: 130 MMHG | WEIGHT: 140 LBS | HEIGHT: 63 IN

## 2025-07-27 DIAGNOSIS — L30.9 ECZEMA, UNSPECIFIED TYPE: Primary | ICD-10-CM

## 2025-07-27 PROCEDURE — 99282 EMERGENCY DEPT VISIT SF MDM: CPT | Performed by: EMERGENCY MEDICINE

## 2025-07-27 PROCEDURE — 99283 EMERGENCY DEPT VISIT LOW MDM: CPT

## 2025-07-27 RX ORDER — METHYLPREDNISOLONE 4 MG/1
TABLET ORAL
Qty: 21 TABLET | Refills: 0 | Status: SHIPPED | OUTPATIENT
Start: 2025-07-27 | End: 2025-08-02

## 2025-07-27 ASSESSMENT — PAIN - FUNCTIONAL ASSESSMENT: PAIN_FUNCTIONAL_ASSESSMENT: 0-10

## 2025-07-27 ASSESSMENT — PAIN SCALES - GENERAL: PAINLEVEL_OUTOF10: 0 - NO PAIN

## 2025-07-27 NOTE — DISCHARGE INSTRUCTIONS
Medrol Dosepak as prescribed.    Wash affected areas twice per day pat dry and apply Aquaphor in a thin layer to keep the skin moist.    You will receive a call from the dermatology department to set up an appointment with an appropriate dermatologist for long-term management of your eczema.

## 2025-07-27 NOTE — ED PROVIDER NOTES
[facility]  ED  Provider Note  7/27/2025 11:28 AM  AC05/AC05              Chief Complaint   Patient presents with    Wound Check         History of Present Illness:   Nicole Bobo is a 84 y.o. female presenting to the ED for chronic eczema, beginning several decades ago.  The complaint has been persistent, severe in severity, and worsened by nothing.  Patient reports increased itching, chronic eczema.  She has no other complaints.      Review of Systems:   Pertinent positives and review of systems as noted above.  Remaining 10 review of systems is negative or noncontributory to today's episode of care.  Review of Systems       --------------------------------------------- PAST HISTORY ---------------------------------------------  Medical History[1]      has a past surgical history that includes Tonsillectomy (01/20/2014) and Appendectomy (01/20/2014).     reports that she has never smoked. She has never used smokeless tobacco. She reports that she does not drink alcohol and does not use drugs.    family history includes Alcohol abuse in her father; Diabetes in her brother and father; Heart failure in her mother. Unless otherwise noted, family history is non contributory    Patient's Medications   New Prescriptions    No medications on file   Previous Medications    DESOXIMETASONE (TOPICORT) 0.25 % CREAM    APPLY ONE APPLICATION TOPICALLY TO AFFECTED AREA DAILY    FERROUS SULFATE 325 MG (65 MG ELEMENTAL) TABLET    TAKE ONE TABLET BY MOUTH TWO TIMES A DAY    FUROSEMIDE (LASIX) 20 MG TABLET    Take 1 tablet (20 mg) by mouth once daily.    IBUPROFEN 200 MG TABLET    Take 3 tablets (600 mg) by mouth once daily as needed.    NEOMYCIN-POLYMYXIN B-DEXAMETH (POLYDEX) 3.5 MG/G-10,000 UNIT/G-0.1 % OINTMENT OPHTHALMIC OINTMENT        SPIRONOLACTONE (ALDACTONE) 25 MG TABLET    Take 1 tablet (25 mg) by mouth once daily.    VIT A/VIT C/VIT E/ZINC/COPPER (PRESERVISION AREDS ORAL)    Take 1 tablet by mouth 2  "times a day.   Modified Medications    No medications on file   Discontinued Medications    No medications on file      The patient’s home medications have been reviewed.    Allergies: Animal dander and Dog dander    -------------------------------------------------- RESULTS -------------------------------------------------  All laboratory and radiology results have been personally reviewed by myself   LABS:  Labs Reviewed - No data to display    EKG:     RADIOLOGY:  Interpreted by Radiologist.  No orders to display       ------------------------- NURSING NOTES AND VITALS REVIEWED ---------------------------   The nursing notes within the ED encounter and vital signs as below have been reviewed.   /76   Pulse 96   Temp 36.4 °C (97.5 °F) (Temporal)   Resp 18   Ht 1.6 m (5' 3\")   Wt 63.5 kg (140 lb)   SpO2 96%   BMI 24.80 kg/m²   Oxygen Saturation Interpretation: Normal      ---------------------------------------------------PHYSICAL EXAM--------------------------------------  Physical Exam   Constitutional/General: Alert and oriented x3, well appearing, non toxic in NAD  Head: Normocephalic and atraumatic  Eyes: PERRL, EOMI, conjunctiva normal, sclera non icteric  Mouth: Oropharynx clear, handling secretions, no trismus, no asymmetry of the posterior oropharynx or uvular edema  Neck: Supple, full ROM, non tender to palpation in the midline, no stridor, no crepitus, no meningeal signs  Respiratory: Lungs clear to auscultation bilaterally, no wheezes, rales, or rhonchi  Cardiovascular:  Regular rate. Regular rhythm. No murmurs, gallops, or rubs. 2+ distal pulses  Chest: No chest wall tenderness  GI:  Abdomen Soft, Non tender, Non distended.  +BS. No organomegaly, no palpable masses,  No rebound, guarding, or rigidity. No CVAT   Musculoskeletal: Moves all extremities x 4. Warm and well perfused, no clubbing, cyanosis, or edema. Capillary refill <3 seconds  Integument: skin warm and dry.  Diffuse eczema " without evidence of secondary infection  Lymphatic: no lymphadenopathy noted  Neurologic:  No focal deficits, symmetric strength 5/5 in the upper and lower extremities bilaterally  Psychiatric: Normal Affect    Procedures    Diagnoses as of 07/27/25 1251   Eczema, unspecified type          Medical Decision Making:   Patient has chronic eczema which she has had for many decades.  She has increased dry skin and itching over the past few months.  She has not seen a dermatologist recently.  I will start the patient on Aquaphor to moisten her skin and prednisone with inflammation.  I have asked the dermatology consultants to call her and arrange for further long-term care.      Counseling:   The emergency provider has spoken with the patient and spouse/SO and discussed today’s results, in addition to providing specific details for the plan of care and counseling regarding the diagnosis and prognosis.  Questions are answered at this time and they are agreeable with the plan.      --------------------------------- IMPRESSION AND DISPOSITION ---------------------------------        IMPRESSION  1. Eczema, unspecified type        DISPOSITION  Disposition: Discharge to home  Patient condition is fair      Billing Provider Critical Care Time: 0 minutes         [1]   Past Medical History:  Diagnosis Date    Anesthesia of skin 08/08/2017    Arm numbness    Other constipation 09/23/2021    Chronic constipation    Pain in left hip 06/08/2016    Hip pain, acute, left    Personal history of other diseases of the respiratory system 11/04/2014    History of sinusitis    Personal history of pneumonia (recurrent) 11/04/2014    History of pneumonia    Unspecified cataract 02/13/2019    Cataracts, bilateral        Timur So MD  07/28/25 [id number]

## 2025-07-27 NOTE — ED TRIAGE NOTES
Pt to ED from home for wound check on her legs, pt has open weeping wounds on her legs that itch, she denies any pain fevers or chills

## 2025-07-29 ENCOUNTER — CLINICAL SUPPORT (OUTPATIENT)
Dept: NUTRITION | Facility: HOSPITAL | Age: 85
End: 2025-07-29
Payer: MEDICARE

## 2025-08-12 ENCOUNTER — APPOINTMENT (OUTPATIENT)
Dept: CARDIOLOGY | Facility: CLINIC | Age: 85
End: 2025-08-12
Payer: MEDICARE

## 2025-08-21 ENCOUNTER — APPOINTMENT (OUTPATIENT)
Dept: PRIMARY CARE | Facility: CLINIC | Age: 85
End: 2025-08-21
Payer: MEDICARE

## 2025-08-21 VITALS
DIASTOLIC BLOOD PRESSURE: 66 MMHG | WEIGHT: 133.4 LBS | HEART RATE: 71 BPM | TEMPERATURE: 97.8 F | OXYGEN SATURATION: 95 % | BODY MASS INDEX: 26.89 KG/M2 | SYSTOLIC BLOOD PRESSURE: 124 MMHG | HEIGHT: 59 IN

## 2025-08-21 DIAGNOSIS — I50.9 ACUTE CONGESTIVE HEART FAILURE, UNSPECIFIED HEART FAILURE TYPE: ICD-10-CM

## 2025-08-21 DIAGNOSIS — R91.1 PULMONARY NODULE: ICD-10-CM

## 2025-08-21 DIAGNOSIS — R91.1 INCIDENTAL LUNG NODULE, > 3MM AND < 8MM: ICD-10-CM

## 2025-08-21 DIAGNOSIS — E55.9 VITAMIN D INSUFFICIENCY: ICD-10-CM

## 2025-08-21 DIAGNOSIS — Z00.00 MEDICARE ANNUAL WELLNESS VISIT, SUBSEQUENT: Primary | ICD-10-CM

## 2025-08-21 DIAGNOSIS — K90.9 IMPAIRED INTESTINAL ABSORPTION (HHS-HCC): ICD-10-CM

## 2025-08-21 DIAGNOSIS — I50.32 CHRONIC HEART FAILURE WITH PRESERVED EJECTION FRACTION (HFPEF): ICD-10-CM

## 2025-08-21 DIAGNOSIS — E61.1 IRON DEFICIENCY: Chronic | ICD-10-CM

## 2025-08-21 DIAGNOSIS — E78.5 BORDERLINE HYPERLIPIDEMIA: ICD-10-CM

## 2025-08-21 PROCEDURE — 1160F RVW MEDS BY RX/DR IN RCRD: CPT | Performed by: PHYSICIAN ASSISTANT

## 2025-08-21 PROCEDURE — 1170F FXNL STATUS ASSESSED: CPT | Performed by: PHYSICIAN ASSISTANT

## 2025-08-21 PROCEDURE — G0439 PPPS, SUBSEQ VISIT: HCPCS | Performed by: PHYSICIAN ASSISTANT

## 2025-08-21 PROCEDURE — 1036F TOBACCO NON-USER: CPT | Performed by: PHYSICIAN ASSISTANT

## 2025-08-21 PROCEDURE — 1159F MED LIST DOCD IN RCRD: CPT | Performed by: PHYSICIAN ASSISTANT

## 2025-08-21 RX ORDER — FERROUS SULFATE 325(65) MG
1 TABLET ORAL 2 TIMES DAILY
Qty: 180 TABLET | Refills: 3 | Status: SHIPPED | OUTPATIENT
Start: 2025-08-21

## 2025-08-21 RX ORDER — SPIRONOLACTONE 25 MG/1
25 TABLET ORAL DAILY
Qty: 90 TABLET | Refills: 3 | Status: SHIPPED | OUTPATIENT
Start: 2025-08-21

## 2025-08-21 ASSESSMENT — PATIENT HEALTH QUESTIONNAIRE - PHQ9
1. LITTLE INTEREST OR PLEASURE IN DOING THINGS: NOT AT ALL
2. FEELING DOWN, DEPRESSED OR HOPELESS: NOT AT ALL
SUM OF ALL RESPONSES TO PHQ9 QUESTIONS 1 AND 2: 0

## 2025-08-21 ASSESSMENT — ACTIVITIES OF DAILY LIVING (ADL)
DOING_HOUSEWORK: NEEDS ASSISTANCE
DRESSING: INDEPENDENT
BATHING: INDEPENDENT
TAKING_MEDICATION: NEEDS ASSISTANCE
GROCERY_SHOPPING: NEEDS ASSISTANCE
MANAGING_FINANCES: NEEDS ASSISTANCE

## 2025-08-21 ASSESSMENT — ENCOUNTER SYMPTOMS
DEPRESSION: 0
OCCASIONAL FEELINGS OF UNSTEADINESS: 1
LOSS OF SENSATION IN FEET: 0

## 2025-08-26 ENCOUNTER — CLINICAL SUPPORT (OUTPATIENT)
Dept: NUTRITION | Facility: HOSPITAL | Age: 85
End: 2025-08-26
Payer: MEDICARE

## 2025-08-26 DIAGNOSIS — Z59.41 FOOD INSECURITY: Primary | ICD-10-CM

## 2025-08-26 SDOH — ECONOMIC STABILITY - FOOD INSECURITY: FOOD INSECURITY: Z59.41

## 2025-08-27 ENCOUNTER — APPOINTMENT (OUTPATIENT)
Dept: CARDIOLOGY | Facility: CLINIC | Age: 85
End: 2025-08-27
Payer: MEDICARE

## 2025-08-27 VITALS
SYSTOLIC BLOOD PRESSURE: 136 MMHG | DIASTOLIC BLOOD PRESSURE: 75 MMHG | HEART RATE: 86 BPM | WEIGHT: 140 LBS | OXYGEN SATURATION: 97 % | BODY MASS INDEX: 28.22 KG/M2 | HEIGHT: 59 IN

## 2025-08-27 DIAGNOSIS — R21 RASH, SKIN: ICD-10-CM

## 2025-08-27 DIAGNOSIS — L29.9 ITCHY SKIN: ICD-10-CM

## 2025-08-27 DIAGNOSIS — I50.32 CHRONIC HEART FAILURE WITH PRESERVED EJECTION FRACTION (HFPEF): Primary | ICD-10-CM

## 2025-08-27 DIAGNOSIS — R20.8 BURNING SENSATION OF SKIN: ICD-10-CM

## 2025-08-27 PROCEDURE — 1159F MED LIST DOCD IN RCRD: CPT | Performed by: PHYSICIAN ASSISTANT

## 2025-08-27 PROCEDURE — 1036F TOBACCO NON-USER: CPT | Performed by: PHYSICIAN ASSISTANT

## 2025-08-27 PROCEDURE — 99214 OFFICE O/P EST MOD 30 MIN: CPT | Performed by: PHYSICIAN ASSISTANT

## 2025-08-30 DIAGNOSIS — I50.9 ACUTE CONGESTIVE HEART FAILURE, UNSPECIFIED HEART FAILURE TYPE: ICD-10-CM

## 2025-08-30 DIAGNOSIS — E55.9 VITAMIN D INSUFFICIENCY: ICD-10-CM

## 2025-08-30 DIAGNOSIS — K90.9 IMPAIRED INTESTINAL ABSORPTION (HHS-HCC): ICD-10-CM

## 2025-08-30 DIAGNOSIS — E78.5 BORDERLINE HYPERLIPIDEMIA: ICD-10-CM

## 2026-02-23 ENCOUNTER — APPOINTMENT (OUTPATIENT)
Dept: PRIMARY CARE | Facility: CLINIC | Age: 86
End: 2026-02-23
Payer: MEDICARE

## 2026-02-24 ENCOUNTER — APPOINTMENT (OUTPATIENT)
Dept: PRIMARY CARE | Facility: CLINIC | Age: 86
End: 2026-02-24
Payer: MEDICARE

## 2026-02-25 ENCOUNTER — APPOINTMENT (OUTPATIENT)
Dept: CARDIOLOGY | Facility: CLINIC | Age: 86
End: 2026-02-25
Payer: MEDICARE